# Patient Record
Sex: FEMALE | Race: WHITE | Employment: UNEMPLOYED | URBAN - METROPOLITAN AREA
[De-identification: names, ages, dates, MRNs, and addresses within clinical notes are randomized per-mention and may not be internally consistent; named-entity substitution may affect disease eponyms.]

---

## 2017-03-22 ENCOUNTER — OFFICE VISIT (OUTPATIENT)
Dept: INTERNAL MEDICINE CLINIC | Age: 42
End: 2017-03-22

## 2017-03-22 VITALS
TEMPERATURE: 98.9 F | BODY MASS INDEX: 24.66 KG/M2 | DIASTOLIC BLOOD PRESSURE: 88 MMHG | SYSTOLIC BLOOD PRESSURE: 130 MMHG | WEIGHT: 134 LBS | HEIGHT: 62 IN | HEART RATE: 104 BPM

## 2017-03-22 DIAGNOSIS — J02.9 SORE THROAT: ICD-10-CM

## 2017-03-22 DIAGNOSIS — R50.9 FEVER, UNSPECIFIED FEVER CAUSE: Primary | ICD-10-CM

## 2017-03-22 LAB
BASOPHILS ABSOLUTE: 0 K/UL (ref 0–0.2)
BASOPHILS RELATIVE PERCENT: 0.3 %
EOSINOPHILS ABSOLUTE: 0.1 K/UL (ref 0–0.6)
EOSINOPHILS RELATIVE PERCENT: 0.4 %
HCT VFR BLD CALC: 45.1 % (ref 36–48)
HEMOGLOBIN: 14.6 G/DL (ref 12–16)
INFLUENZA A ANTIBODY: NEGATIVE
INFLUENZA B ANTIBODY: NEGATIVE
LYMPHOCYTES ABSOLUTE: 1.8 K/UL (ref 1–5.1)
LYMPHOCYTES RELATIVE PERCENT: 12.1 %
MCH RBC QN AUTO: 30 PG (ref 26–34)
MCHC RBC AUTO-ENTMCNC: 32.4 G/DL (ref 31–36)
MCV RBC AUTO: 92.5 FL (ref 80–100)
MONO TEST: NEGATIVE
MONOCYTES ABSOLUTE: 0.9 K/UL (ref 0–1.3)
MONOCYTES RELATIVE PERCENT: 6 %
NEUTROPHILS ABSOLUTE: 12.3 K/UL (ref 1.7–7.7)
NEUTROPHILS RELATIVE PERCENT: 81.2 %
PDW BLD-RTO: 13.5 % (ref 12.4–15.4)
PLATELET # BLD: 331 K/UL (ref 135–450)
PMV BLD AUTO: 8.9 FL (ref 5–10.5)
RBC # BLD: 4.88 M/UL (ref 4–5.2)
S PYO AG THROAT QL: NORMAL
WBC # BLD: 15.1 K/UL (ref 4–11)

## 2017-03-22 PROCEDURE — 99213 OFFICE O/P EST LOW 20 MIN: CPT | Performed by: FAMILY MEDICINE

## 2017-03-22 PROCEDURE — 87804 INFLUENZA ASSAY W/OPTIC: CPT | Performed by: FAMILY MEDICINE

## 2017-03-22 PROCEDURE — 87880 STREP A ASSAY W/OPTIC: CPT | Performed by: FAMILY MEDICINE

## 2017-03-22 RX ORDER — AMOXICILLIN 875 MG/1
875 TABLET, COATED ORAL 2 TIMES DAILY
Qty: 20 TABLET | Refills: 0 | Status: SHIPPED | OUTPATIENT
Start: 2017-03-22 | End: 2017-04-01

## 2017-03-22 ASSESSMENT — ENCOUNTER SYMPTOMS
WHEEZING: 0
SHORTNESS OF BREATH: 0
FACIAL SWELLING: 0
CHEST TIGHTNESS: 0
TROUBLE SWALLOWING: 0

## 2017-03-23 LAB
ORGANISM: ABNORMAL
THROAT CULTURE: ABNORMAL
THROAT CULTURE: ABNORMAL

## 2017-04-04 ENCOUNTER — OFFICE VISIT (OUTPATIENT)
Dept: INTERNAL MEDICINE CLINIC | Age: 42
End: 2017-04-04

## 2017-04-04 VITALS
HEIGHT: 62 IN | DIASTOLIC BLOOD PRESSURE: 100 MMHG | WEIGHT: 135.8 LBS | HEART RATE: 80 BPM | BODY MASS INDEX: 24.99 KG/M2 | SYSTOLIC BLOOD PRESSURE: 144 MMHG

## 2017-04-04 DIAGNOSIS — R03.0 ELEVATED BP WITHOUT DIAGNOSIS OF HYPERTENSION: ICD-10-CM

## 2017-04-04 DIAGNOSIS — Z00.00 PREVENTATIVE HEALTH CARE: Primary | ICD-10-CM

## 2017-04-04 DIAGNOSIS — F43.23 ADJUSTMENT DISORDER WITH MIXED ANXIETY AND DEPRESSED MOOD: ICD-10-CM

## 2017-04-04 PROCEDURE — 99395 PREV VISIT EST AGE 18-39: CPT | Performed by: FAMILY MEDICINE

## 2017-04-04 RX ORDER — DROSPIRENONE AND ETHINYL ESTRADIOL TABLETS 0.02-3(28)
1 KIT ORAL DAILY
COMMUNITY
Start: 2017-03-15

## 2017-04-04 RX ORDER — BISOPROLOL FUMARATE AND HYDROCHLOROTHIAZIDE 2.5; 6.25 MG/1; MG/1
1 TABLET ORAL DAILY
Qty: 30 TABLET | Refills: 3 | Status: SHIPPED | OUTPATIENT
Start: 2017-04-04 | End: 2017-06-12 | Stop reason: SDUPTHER

## 2017-04-27 ENCOUNTER — OFFICE VISIT (OUTPATIENT)
Dept: PSYCHOLOGY | Age: 42
End: 2017-04-27

## 2017-04-27 DIAGNOSIS — F41.1 GAD (GENERALIZED ANXIETY DISORDER): Primary | ICD-10-CM

## 2017-04-27 LAB
A/G RATIO: 1.7 (ref 1.1–2.2)
ALBUMIN SERPL-MCNC: 4.5 G/DL (ref 3.4–5)
ALP BLD-CCNC: 42 U/L (ref 40–129)
ALT SERPL-CCNC: 8 U/L (ref 10–40)
ANION GAP SERPL CALCULATED.3IONS-SCNC: 17 MMOL/L (ref 3–16)
AST SERPL-CCNC: 12 U/L (ref 15–37)
BASOPHILS ABSOLUTE: 0.1 K/UL (ref 0–0.2)
BASOPHILS RELATIVE PERCENT: 0.8 %
BILIRUB SERPL-MCNC: 0.3 MG/DL (ref 0–1)
BUN BLDV-MCNC: 12 MG/DL (ref 7–20)
CALCIUM SERPL-MCNC: 9.4 MG/DL (ref 8.3–10.6)
CHLORIDE BLD-SCNC: 99 MMOL/L (ref 99–110)
CHOLESTEROL, TOTAL: 248 MG/DL (ref 0–199)
CO2: 23 MMOL/L (ref 21–32)
CREAT SERPL-MCNC: 0.7 MG/DL (ref 0.6–1.1)
EOSINOPHILS ABSOLUTE: 0.1 K/UL (ref 0–0.6)
EOSINOPHILS RELATIVE PERCENT: 1.2 %
GFR AFRICAN AMERICAN: >60
GFR NON-AFRICAN AMERICAN: >60
GLOBULIN: 2.6 G/DL
GLUCOSE BLD-MCNC: 81 MG/DL (ref 70–99)
HCT VFR BLD CALC: 43 % (ref 36–48)
HDLC SERPL-MCNC: 106 MG/DL (ref 40–60)
HEMOGLOBIN: 13.7 G/DL (ref 12–16)
LDL CHOLESTEROL CALCULATED: 127 MG/DL
LYMPHOCYTES ABSOLUTE: 2.6 K/UL (ref 1–5.1)
LYMPHOCYTES RELATIVE PERCENT: 35.6 %
MCH RBC QN AUTO: 29.4 PG (ref 26–34)
MCHC RBC AUTO-ENTMCNC: 31.9 G/DL (ref 31–36)
MCV RBC AUTO: 92.2 FL (ref 80–100)
MONOCYTES ABSOLUTE: 0.6 K/UL (ref 0–1.3)
MONOCYTES RELATIVE PERCENT: 8.2 %
NEUTROPHILS ABSOLUTE: 4 K/UL (ref 1.7–7.7)
NEUTROPHILS RELATIVE PERCENT: 54.2 %
PDW BLD-RTO: 13.4 % (ref 12.4–15.4)
PLATELET # BLD: 305 K/UL (ref 135–450)
PMV BLD AUTO: 8.9 FL (ref 5–10.5)
POTASSIUM SERPL-SCNC: 4.5 MMOL/L (ref 3.5–5.1)
RBC # BLD: 4.66 M/UL (ref 4–5.2)
SODIUM BLD-SCNC: 139 MMOL/L (ref 136–145)
TOTAL PROTEIN: 7.1 G/DL (ref 6.4–8.2)
TRIGL SERPL-MCNC: 75 MG/DL (ref 0–150)
TSH REFLEX: 1.77 UIU/ML (ref 0.27–4.2)
VLDLC SERPL CALC-MCNC: 15 MG/DL
WBC # BLD: 7.4 K/UL (ref 4–11)

## 2017-04-27 PROCEDURE — 90791 PSYCH DIAGNOSTIC EVALUATION: CPT | Performed by: PSYCHOLOGIST

## 2017-04-28 PROBLEM — F41.1 GAD (GENERALIZED ANXIETY DISORDER): Status: ACTIVE | Noted: 2017-04-28

## 2017-05-01 ENCOUNTER — TELEPHONE (OUTPATIENT)
Dept: INTERNAL MEDICINE CLINIC | Age: 42
End: 2017-05-01

## 2017-05-01 RX ORDER — CETIRIZINE HYDROCHLORIDE, PSEUDOEPHEDRINE HYDROCHLORIDE 5; 120 MG/1; MG/1
TABLET, FILM COATED, EXTENDED RELEASE ORAL
Qty: 30 TABLET | Refills: 5 | Status: SHIPPED | OUTPATIENT
Start: 2017-05-01

## 2017-06-12 ENCOUNTER — OFFICE VISIT (OUTPATIENT)
Dept: INTERNAL MEDICINE CLINIC | Age: 42
End: 2017-06-12

## 2017-06-12 VITALS
DIASTOLIC BLOOD PRESSURE: 74 MMHG | BODY MASS INDEX: 24.84 KG/M2 | WEIGHT: 135 LBS | SYSTOLIC BLOOD PRESSURE: 118 MMHG | HEART RATE: 68 BPM | HEIGHT: 62 IN

## 2017-06-12 DIAGNOSIS — I34.1 MITRAL VALVE PROLAPSE: ICD-10-CM

## 2017-06-12 DIAGNOSIS — R03.0 ELEVATED BP WITHOUT DIAGNOSIS OF HYPERTENSION: Primary | ICD-10-CM

## 2017-06-12 DIAGNOSIS — F43.23 ADJUSTMENT DISORDER WITH MIXED ANXIETY AND DEPRESSED MOOD: ICD-10-CM

## 2017-06-12 PROCEDURE — 99213 OFFICE O/P EST LOW 20 MIN: CPT | Performed by: FAMILY MEDICINE

## 2017-06-12 RX ORDER — SERTRALINE HYDROCHLORIDE 25 MG/1
25 TABLET, FILM COATED ORAL DAILY
Qty: 30 TABLET | Refills: 1 | Status: SHIPPED | OUTPATIENT
Start: 2017-06-12 | End: 2017-08-26 | Stop reason: SDUPTHER

## 2017-06-12 RX ORDER — BISOPROLOL FUMARATE AND HYDROCHLOROTHIAZIDE 2.5; 6.25 MG/1; MG/1
1 TABLET ORAL DAILY
Qty: 30 TABLET | Refills: 5 | Status: SHIPPED | OUTPATIENT
Start: 2017-06-12 | End: 2018-01-09 | Stop reason: SDUPTHER

## 2017-06-12 ASSESSMENT — ENCOUNTER SYMPTOMS
SHORTNESS OF BREATH: 0
CHEST TIGHTNESS: 0
WHEEZING: 0
COUGH: 0

## 2017-08-26 DIAGNOSIS — F43.23 ADJUSTMENT DISORDER WITH MIXED ANXIETY AND DEPRESSED MOOD: ICD-10-CM

## 2017-08-28 RX ORDER — SERTRALINE HYDROCHLORIDE 25 MG/1
TABLET, FILM COATED ORAL
Qty: 30 TABLET | Refills: 1 | Status: SHIPPED | OUTPATIENT
Start: 2017-08-28 | End: 2017-10-18 | Stop reason: SDUPTHER

## 2017-09-18 LAB
HPV, INTERPRETATION: NORMAL
PAP SMEAR: NORMAL

## 2017-09-27 ENCOUNTER — TELEPHONE (OUTPATIENT)
Dept: INTERNAL MEDICINE CLINIC | Age: 42
End: 2017-09-27

## 2017-09-27 RX ORDER — PREDNISONE 10 MG/1
TABLET ORAL
Qty: 30 TABLET | Refills: 0 | Status: SHIPPED | OUTPATIENT
Start: 2017-09-27 | End: 2017-10-07

## 2017-10-18 ENCOUNTER — OFFICE VISIT (OUTPATIENT)
Dept: INTERNAL MEDICINE CLINIC | Age: 42
End: 2017-10-18

## 2017-10-18 VITALS
SYSTOLIC BLOOD PRESSURE: 122 MMHG | HEIGHT: 62 IN | BODY MASS INDEX: 25.4 KG/M2 | WEIGHT: 138 LBS | HEART RATE: 68 BPM | DIASTOLIC BLOOD PRESSURE: 80 MMHG

## 2017-10-18 DIAGNOSIS — F43.23 ADJUSTMENT DISORDER WITH MIXED ANXIETY AND DEPRESSED MOOD: Primary | ICD-10-CM

## 2017-10-18 DIAGNOSIS — Z23 NEED FOR INFLUENZA VACCINATION: ICD-10-CM

## 2017-10-18 PROCEDURE — 99213 OFFICE O/P EST LOW 20 MIN: CPT | Performed by: FAMILY MEDICINE

## 2017-10-18 PROCEDURE — 90471 IMMUNIZATION ADMIN: CPT | Performed by: FAMILY MEDICINE

## 2017-10-18 PROCEDURE — 90686 IIV4 VACC NO PRSV 0.5 ML IM: CPT | Performed by: FAMILY MEDICINE

## 2017-10-18 RX ORDER — SERTRALINE HYDROCHLORIDE 100 MG/1
100 TABLET, FILM COATED ORAL DAILY
Qty: 30 TABLET | Refills: 3 | Status: SHIPPED | OUTPATIENT
Start: 2017-11-01 | End: 2018-01-17 | Stop reason: SDUPTHER

## 2017-10-18 ASSESSMENT — PATIENT HEALTH QUESTIONNAIRE - PHQ9
SUM OF ALL RESPONSES TO PHQ9 QUESTIONS 1 & 2: 0
SUM OF ALL RESPONSES TO PHQ QUESTIONS 1-9: 0
2. FEELING DOWN, DEPRESSED OR HOPELESS: 0
1. LITTLE INTEREST OR PLEASURE IN DOING THINGS: 0

## 2017-10-18 NOTE — PATIENT INSTRUCTIONS
Consider My Chart sign up. Doug for your smart phone is very helpful  Keep in touch about how you are doing with the Sertraline. Side effects that do not go away in a couple weeks if mild and tolerable or have something that is severe so I can change the medication. Then also communicate to me if no response in 6 weeks. approx 11/29/17. Let me know also if you are good at the 100 mg dose. Go up to 50 mg of Sertraline right away. In 2 weeks, bump to 100 mg = 2 tablets of the 50 mg right away. (I wrote the directions for 1.5 tablets = 45 so hopefully you don't have to get an additional 50 mg dose filled in the time you are taking it)  When you need a refill, if doing OK at 100 mg, take the blue prescription paper in for 100 mg tablet. [id number]    WEIGHT LOSS SUGGESTIONS --- pick and choose items from the following to work on.  1.  Eat breakfast --- the most important meal of the day (and try to spread your calories out over the day.)    2. Eat your biggest meal of the day at lunch or avoid having all your calories for supper. Food if to provide energy during your time of activity. We do not need food before we sleep or rest.  3.  Begin meals with a low fat salad, soup, broth or a glass of water. 4.  Eat more vegetables and whole grains at each meal.  5.  Limit sugar sweetened beverages to no more than 8 ounces of REAL fruit juice per day. Even sweet flavored diet drinks can make you crave more sweets and starches. 6.  Limit your alcohol intake. 7.  Know what a portion size looks like --- and stick to it. Measure food or learn the portion sizes using your hand, palm and fist.  Using smaller plates and glasses can help. Portion sizes:  Solid foods like a potato = the size of your fist.  Flat foods, like a piece of meat the size of your hand minus your fingers. 8.  Eat slowly so your body has time to know when you are full.   Also slowing down will help you focus on enjoying the tastes and smells of your food. Stop eating when you feel satisfied, not stuffed. 9.  Be more active in your daily life. 8. Get support from family and friends. Support is important for long-term success. 11.  Fill 1/2 your plate with vegetables and fruits;  1/4 with protein like meat or beans and 1/4 with starch --- whole grain is best.    12. AVOID ARTIFICIAL SWEETENERS and HIGH FRUCTOSE CORN SYRUP as much as possible and certainly do not use these every day. Therefore, all soda pop -- both diet and regular are out for daily use. 13.  MINIMIZE packaged foods with PRESERVATIVES. 14.  Eat food as close to nature as possible and eat organic when possible. You did not likely gain 10 pounds in 1 month, so do not expect to lose 10 pounds in 1 month. Aim for 1/2 to 1 pound per week each month consistently.

## 2017-10-18 NOTE — PROGRESS NOTES
Subjective:      Patient ID: Blake Oconnor is a 46 y.o. female. HPI   Chief Complaint   Patient presents with    3 Month Follow-Up     FU of sertraline. No side effects but no improvement either. Still feeling a lot of anxiety. She is also on Ziac for elevated BP. It seems to be fine. Not c/o  She has not had a lot of headaches so this is probably working well. Review of Systems   Constitutional: Negative for unexpected weight change. Gastrointestinal: Negative for diarrhea and nausea. Psychiatric/Behavioral: Negative for agitation, behavioral problems, confusion, self-injury, sleep disturbance and suicidal ideas. Objective:   Physical Exam   Constitutional: She appears well-developed and well-nourished. Cardiovascular: Normal rate, regular rhythm, normal heart sounds and intact distal pulses. Pulmonary/Chest: Effort normal and breath sounds normal.   Skin: Skin is warm and dry. No erythema. No pallor. Psychiatric: She has a normal mood and affect. Her behavior is normal. Cognition and memory are normal.   Vitals reviewed. Wt Readings from Last 3 Encounters:   10/18/17 138 lb (62.6 kg)   06/12/17 135 lb (61.2 kg)   04/04/17 135 lb 12.8 oz (61.6 kg)     Temp Readings from Last 3 Encounters:   03/22/17 98.9 °F (37.2 °C) (Oral)   04/19/16 98.7 °F (37.1 °C) (Oral)   10/15/13 98.6 °F (37 °C) (Oral)     BP Readings from Last 3 Encounters:   10/18/17 122/80   06/12/17 118/74   04/04/17 (!) 144/100     Pulse Readings from Last 3 Encounters:   10/18/17 68   06/12/17 68   04/04/17 80         Assessment:      1. Adjustment disorder with mixed anxiety and depressed mood  Work dose up to 50 mg daily and if needed 75 mg to 100 mg daily. Discussed. - sertraline (ZOLOFT) 50 MG tablet; TAKE 1.5 TABLETS EVERY DAY  Dispense: 45 tablet; Refill: 1    2. Need for influenza vaccination  Vaccine information statement given.   Risk and benefits of vaccine(s) given discussed with patient and

## 2017-10-19 ASSESSMENT — ENCOUNTER SYMPTOMS
NAUSEA: 0
DIARRHEA: 0

## 2017-12-17 DIAGNOSIS — F43.23 ADJUSTMENT DISORDER WITH MIXED ANXIETY AND DEPRESSED MOOD: ICD-10-CM

## 2018-01-09 DIAGNOSIS — R03.0 ELEVATED BP WITHOUT DIAGNOSIS OF HYPERTENSION: ICD-10-CM

## 2018-01-09 RX ORDER — BISOPROLOL FUMARATE AND HYDROCHLOROTHIAZIDE 2.5; 6.25 MG/1; MG/1
TABLET ORAL
Qty: 30 TABLET | Refills: 5 | Status: SHIPPED | OUTPATIENT
Start: 2018-01-09 | End: 2018-01-17 | Stop reason: SDUPTHER

## 2018-01-17 ENCOUNTER — OFFICE VISIT (OUTPATIENT)
Dept: INTERNAL MEDICINE CLINIC | Age: 43
End: 2018-01-17

## 2018-01-17 VITALS
DIASTOLIC BLOOD PRESSURE: 80 MMHG | WEIGHT: 143 LBS | BODY MASS INDEX: 26.31 KG/M2 | HEART RATE: 80 BPM | HEIGHT: 62 IN | SYSTOLIC BLOOD PRESSURE: 116 MMHG

## 2018-01-17 DIAGNOSIS — R03.0 ELEVATED BP WITHOUT DIAGNOSIS OF HYPERTENSION: ICD-10-CM

## 2018-01-17 DIAGNOSIS — F41.1 GAD (GENERALIZED ANXIETY DISORDER): Primary | ICD-10-CM

## 2018-01-17 DIAGNOSIS — R20.0 BILATERAL HAND NUMBNESS: ICD-10-CM

## 2018-01-17 PROCEDURE — 99213 OFFICE O/P EST LOW 20 MIN: CPT | Performed by: FAMILY MEDICINE

## 2018-01-17 RX ORDER — BISOPROLOL FUMARATE AND HYDROCHLOROTHIAZIDE 2.5; 6.25 MG/1; MG/1
TABLET ORAL
Qty: 90 TABLET | Refills: 1 | Status: SHIPPED | OUTPATIENT
Start: 2018-01-17 | End: 2019-01-17 | Stop reason: SDUPTHER

## 2018-01-17 RX ORDER — SERTRALINE HYDROCHLORIDE 100 MG/1
100 TABLET, FILM COATED ORAL DAILY
Qty: 90 TABLET | Refills: 3 | Status: SHIPPED | OUTPATIENT
Start: 2018-01-17 | End: 2018-04-20 | Stop reason: DRUGHIGH

## 2018-01-17 ASSESSMENT — ENCOUNTER SYMPTOMS
CHEST TIGHTNESS: 0
WHEEZING: 0
SHORTNESS OF BREATH: 0
COLOR CHANGE: 0
COUGH: 0

## 2018-01-17 NOTE — PROGRESS NOTES
difficulty, weakness and headaches. Psychiatric/Behavioral: Negative for dysphoric mood and sleep disturbance. The patient is not nervous/anxious. Objective:   Physical Exam   Constitutional: She appears well-developed and well-nourished. Cardiovascular: Normal rate, regular rhythm, normal heart sounds and intact distal pulses. No murmur heard. Pulses:       Radial pulses are 2+ on the right side, and 2+ on the left side. Pulmonary/Chest: Effort normal and breath sounds normal.   Musculoskeletal:        Right wrist: Normal.        Left wrist: Normal.   Negative Tinel. Mild tingling noted with Phalen but also with elevated arm stress test.   Skin: Skin is warm and dry. No erythema. No pallor. Hands are faintly red but they do appear dry. Not really a hyperemia and not pale nor cyanotic. Psychiatric: She has a normal mood and affect. Her behavior is normal. Cognition and memory are normal.   Vitals reviewed. Wt Readings from Last 3 Encounters:   01/17/18 143 lb (64.9 kg)   10/18/17 138 lb (62.6 kg)   06/12/17 135 lb (61.2 kg)     Temp Readings from Last 3 Encounters:   03/22/17 98.9 °F (37.2 °C) (Oral)   04/19/16 98.7 °F (37.1 °C) (Oral)   10/15/13 98.6 °F (37 °C) (Oral)     BP Readings from Last 3 Encounters:   01/17/18 116/80   10/18/17 122/80   06/12/17 118/74     Pulse Readings from Last 3 Encounters:   01/17/18 80   10/18/17 68   06/12/17 68         Assessment:      1. ARA (generalized anxiety disorder)  Doing well with current dose. - sertraline (ZOLOFT) 100 MG tablet; Take 1 tablet by mouth daily  Dispense: 90 tablet; Refill: 3    2. Bilateral hand numbness  Start with wrist splints at night. Consider Raynaud's but currently no color change in the office and she has not noted this. If wrist splints help some, may need to wear at work. If symptoms persist or develops pain, will order EMG. - Misc. Devices MISC; velcro close wrist splint with metal stay.    Bilateral hands/wrists. Dispense: 2 each; Refill: 0    3. Elevated BP without diagnosis of hypertension  BP: 116/80   At goal and meeting medical guidelines. Continue treatment. - bisoprolol-hydrochlorothiazide (ZIAC) 2.5-6.25 MG per tablet; TAKE 1 TABLET EVERY DAY  Dispense: 90 tablet; Refill: 1          Plan:      See assessment and/or orders. See after visit summary, patient instructions, and reference hand-outs. Discussed use, benefit, and side effects of prescribed medications. Barriers to medication compliance addressed. All patient questions answered.

## 2018-02-24 DIAGNOSIS — F43.23 ADJUSTMENT DISORDER WITH MIXED ANXIETY AND DEPRESSED MOOD: ICD-10-CM

## 2018-05-02 ENCOUNTER — OFFICE VISIT (OUTPATIENT)
Dept: INTERNAL MEDICINE CLINIC | Age: 43
End: 2018-05-02

## 2018-05-02 VITALS
HEART RATE: 60 BPM | SYSTOLIC BLOOD PRESSURE: 122 MMHG | HEIGHT: 62 IN | WEIGHT: 142 LBS | BODY MASS INDEX: 26.13 KG/M2 | DIASTOLIC BLOOD PRESSURE: 60 MMHG

## 2018-05-02 DIAGNOSIS — Z00.00 ROUTINE PHYSICAL EXAMINATION: Primary | ICD-10-CM

## 2018-05-02 DIAGNOSIS — R03.0 ELEVATED BP WITHOUT DIAGNOSIS OF HYPERTENSION: ICD-10-CM

## 2018-05-02 LAB
A/G RATIO: 1.7 (ref 1.1–2.2)
ALBUMIN SERPL-MCNC: 4.3 G/DL (ref 3.4–5)
ALP BLD-CCNC: 44 U/L (ref 40–129)
ALT SERPL-CCNC: 12 U/L (ref 10–40)
ANION GAP SERPL CALCULATED.3IONS-SCNC: 14 MMOL/L (ref 3–16)
AST SERPL-CCNC: 12 U/L (ref 15–37)
BASOPHILS ABSOLUTE: 0.1 K/UL (ref 0–0.2)
BASOPHILS RELATIVE PERCENT: 0.7 %
BILIRUB SERPL-MCNC: <0.2 MG/DL (ref 0–1)
BUN BLDV-MCNC: 13 MG/DL (ref 7–20)
CALCIUM SERPL-MCNC: 9.3 MG/DL (ref 8.3–10.6)
CHLORIDE BLD-SCNC: 100 MMOL/L (ref 99–110)
CHOLESTEROL, TOTAL: 234 MG/DL (ref 0–199)
CO2: 25 MMOL/L (ref 21–32)
CREAT SERPL-MCNC: 0.6 MG/DL (ref 0.6–1.1)
EOSINOPHILS ABSOLUTE: 0.1 K/UL (ref 0–0.6)
EOSINOPHILS RELATIVE PERCENT: 0.9 %
GFR AFRICAN AMERICAN: >60
GFR NON-AFRICAN AMERICAN: >60
GLOBULIN: 2.6 G/DL
GLUCOSE BLD-MCNC: 74 MG/DL (ref 70–99)
HCT VFR BLD CALC: 37.4 % (ref 36–48)
HDLC SERPL-MCNC: 98 MG/DL (ref 40–60)
HEMOGLOBIN: 12.7 G/DL (ref 12–16)
LDL CHOLESTEROL CALCULATED: 111 MG/DL
LYMPHOCYTES ABSOLUTE: 3.8 K/UL (ref 1–5.1)
LYMPHOCYTES RELATIVE PERCENT: 39.6 %
MCH RBC QN AUTO: 30.6 PG (ref 26–34)
MCHC RBC AUTO-ENTMCNC: 34.1 G/DL (ref 31–36)
MCV RBC AUTO: 89.9 FL (ref 80–100)
MONOCYTES ABSOLUTE: 0.7 K/UL (ref 0–1.3)
MONOCYTES RELATIVE PERCENT: 7 %
NEUTROPHILS ABSOLUTE: 4.9 K/UL (ref 1.7–7.7)
NEUTROPHILS RELATIVE PERCENT: 51.8 %
PDW BLD-RTO: 13.8 % (ref 12.4–15.4)
PLATELET # BLD: 360 K/UL (ref 135–450)
PMV BLD AUTO: 8.4 FL (ref 5–10.5)
POTASSIUM SERPL-SCNC: 4 MMOL/L (ref 3.5–5.1)
RBC # BLD: 4.16 M/UL (ref 4–5.2)
SODIUM BLD-SCNC: 139 MMOL/L (ref 136–145)
TOTAL PROTEIN: 6.9 G/DL (ref 6.4–8.2)
TRIGL SERPL-MCNC: 123 MG/DL (ref 0–150)
VLDLC SERPL CALC-MCNC: 25 MG/DL
WBC # BLD: 9.5 K/UL (ref 4–11)

## 2018-05-02 PROCEDURE — 99396 PREV VISIT EST AGE 40-64: CPT | Performed by: FAMILY MEDICINE

## 2018-08-23 ENCOUNTER — OFFICE VISIT (OUTPATIENT)
Dept: INTERNAL MEDICINE CLINIC | Age: 43
End: 2018-08-23

## 2018-08-23 VITALS
HEIGHT: 62 IN | HEART RATE: 68 BPM | DIASTOLIC BLOOD PRESSURE: 60 MMHG | SYSTOLIC BLOOD PRESSURE: 90 MMHG | WEIGHT: 140 LBS | BODY MASS INDEX: 25.76 KG/M2

## 2018-08-23 DIAGNOSIS — R55 VASOVAGAL ATTACK: ICD-10-CM

## 2018-08-23 DIAGNOSIS — R20.0 NUMBNESS: Primary | ICD-10-CM

## 2018-08-23 DIAGNOSIS — G43.909 MIGRAINE WITHOUT STATUS MIGRAINOSUS, NOT INTRACTABLE, UNSPECIFIED MIGRAINE TYPE: ICD-10-CM

## 2018-08-23 PROCEDURE — 99213 OFFICE O/P EST LOW 20 MIN: CPT | Performed by: FAMILY MEDICINE

## 2018-08-23 NOTE — PATIENT INSTRUCTIONS
aged cheeses, and pickled foods. · Limit caffeine by not drinking too much coffee, tea, or soda. Do not quit caffeine suddenly, because that can also give you migraines. · Do not smoke or allow others to smoke around you. If you need help quitting, talk to your doctor about stop-smoking programs and medicines. These can increase your chances of quitting for good. · If you are taking birth control pills or hormone therapy, talk to your doctor about whether they are triggering your migraines. When should you call for help? Call 911 anytime you think you may need emergency care. For example, call if:    · You have symptoms of a stroke. These may include:  ¨ Sudden numbness, tingling, weakness, or loss of movement in your face, arm, or leg, especially on only one side of your body. ¨ Sudden vision changes. ¨ Sudden trouble speaking. ¨ Sudden confusion or trouble understanding simple statements. ¨ Sudden problems with walking or balance. ¨ A sudden, severe headache that is different from past headaches.    Call your doctor now or seek immediate medical care if:    · You develop a fever and a stiff neck.     · You have new nausea and vomiting, or you cannot keep down food or liquids.    Watch closely for changes in your health, and be sure to contact your doctor if:    · You have a headache that does not get better within 1 or 2 days.     · Your headaches get worse or happen more often. Where can you learn more? Go to https://Leadformancedaniel.CoinEx.pw. org and sign in to your [employer] account. Enter  in the [facility] box to learn more about \"Recurring Migraine Headache: Care Instructions. \"     If you do not have an account, please click on the \"Sign Up Now\" link. Current as of: October 9, 2017  Content Version: 11.7  © 2507-2235 [employer], Incorporated. Care instructions adapted under license by [facility] ([facility]).  If you have questions about a medical condition or this instruction, always ask quitting, talk to your doctor about stop-smoking programs and medicines. These can increase your chances of quitting for good. · If you are taking birth control pills or hormone therapy, talk to your doctor about whether they are triggering your migraines. When should you call for help? Call 911 anytime you think you may need emergency care. For example, call if:    · You have signs of a stroke. These may include:  ¨ Sudden numbness, paralysis, or weakness in your face, arm, or leg, especially on only one side of your body. ¨ Sudden vision changes. ¨ Sudden trouble speaking. ¨ Sudden confusion or trouble understanding simple statements. ¨ Sudden problems with walking or balance. ¨ A sudden, severe headache that is different from past headaches.    Call your doctor now or seek immediate medical care if:    · You have new or worse nausea and vomiting.     · You have a new or higher fever.     · Your headache gets much worse.    Watch closely for changes in your health, and be sure to contact your doctor if:    · You are not getting better after 2 days (48 hours). Where can you learn more? Go to https://Affirm.AB Tasty. org and sign in to your E[employer] account. Enter N527 in the [employer]erks box to learn more about \"Migraine Headache: Care Instructions. \"     If you do not have an account, please click on the \"Sign Up Now\" link. Current as of: October 9, 2017  Content Version: 11.7  © 8112-9501 Healthwise, Incorporated. Care instructions adapted under license by [facility] ([facility]). If you have questions about a medical condition or this instruction, always ask your healthcare professional. [place] [address] any warranty or liability for your use of this information. Patient Education        Hypoglycemia: Care Instructions  Your Care Instructions  Hypoglycemia means that your blood sugar is low and your body is not getting enough fuel.  Some people get low blood sugar need emergency care. For example, call if:    · You passed out (lost consciousness).     · You are confused or cannot think clearly.     · Your blood sugar is very high or very low.    Watch closely for changes in your health, and be sure to contact your doctor if:    · Your blood sugar stays outside the level your doctor set for you.     · You have any problems. Where can you learn more? Go to https://Wavemaker Software.Razient. org and sign in to your Vend-[employer]-Bar account. Enter I256 in the [employer] box to learn more about \"Hypoglycemia: Care Instructions. \"     If you do not have an account, please click on the \"Sign Up Now\" link. Current as of: December 7, 2017  Content Version: 11.7  © 7036-9074 Dexterra, Incorporated. Care instructions adapted under license by [facility] ([facility]). If you have questions about a medical condition or this instruction, always ask your healthcare professional. [address] any warranty or liability for your use of this information.

## 2018-08-23 NOTE — PROGRESS NOTES
Subjective:      Patient ID: Jackie Nielsen is a 38 y.o. female. HPI   Chief Complaint   Patient presents with    Paralysis     Had some temporary paralysis on Friday last week. [place] sick all of a sudden while driving. Had to pull over, and suddenly could not move her hands or legs. Was very sweaty, and tired and weak afterward. Dizziness off and on the rest of the weekend. Worked on Friday 8/20/18 and felt really nauseous on her drive home from work. She pulled the car over off the road because she felt like she needed to vomit. Then noted that her hands felt numb and hands did draw up or spasm. Her legs felt numb also. She felt she could not move and felt parallyzed for a few minutes. Did vomit out the car window or door and was able to move then. Her legs tingled but were not limp or truly paralyzed. Her  was actually following in his car behind her and came to check on her immediately. By the time he got to her car, she was able to move arms and legs. But it took about 15 minutes before she felt she was able to use her arms and legs normally. Did not eat much that day. Raspberries and blueberries around 11 AM and drank water --- no other food or liquid. This episode happened around 5 PM.    She has been having more headaches again. Probably some of them are migrainous. Taking EXcedrine Migraine and it works pretty well. Estimates that she uses it one day per week at the most.  Had a period of time in her 25s when she had a lot of severe migraines and diagnosed as migraine. Thinks she had an MRI scan back then.           Patient Active Problem List   Diagnosis    Mitral valve prolapse    Adjustment disorder with mixed anxiety and depressed mood    Elevated BP without diagnosis of hypertension    ARA (generalized anxiety disorder)       Outpatient Prescriptions Marked as Taking for the 8/23/18 encounter (Office Visit) with Claudeen Metro, MD   Medication Sig Dispense Refill    sertraline (ZOLOFT) 50 MG tablet TAKE ONE AND ONE-HALF TABLETS BY MOUTH ONCE DAILY 45 tablet 5    bisoprolol-hydrochlorothiazide (ZIAC) 2.5-6.25 MG per tablet TAKE 1 TABLET EVERY DAY 90 tablet 1    cetirizine-psuedoephedrine ([employer] ALLERGY RELIEF-D) 5-120 MG per extended release tablet TAKE 1 TABLET BY MOUTH 2 TIMES DAILY AS NEEDED FOR ALLERGIES OR RHINITIS 30 tablet 5    LORYNA 3-0.02 MG per tablet Take 1 tablet by mouth daily           Review of Systems   Constitutional: Negative for fever. Cardiovascular: Negative for chest pain. Neurological: Negative for seizures, syncope, facial asymmetry and speech difficulty. Objective:   Physical Exam   Constitutional: She is oriented to person, place, and time. She appears well-developed and well-nourished. No distress. HENT:   Right Ear: Tympanic membrane, external ear and ear canal normal.   Left Ear: Tympanic membrane, external ear and ear canal normal.   Nose: No sinus tenderness. No epistaxis. Right sinus exhibits no maxillary sinus tenderness and no frontal sinus tenderness. Left sinus exhibits no maxillary sinus tenderness and no frontal sinus tenderness. Mouth/Throat: Uvula is midline and mucous membranes are normal. No trismus in the jaw. No uvula swelling. No oropharyngeal exudate, posterior oropharyngeal edema or posterior oropharyngeal erythema. Eyes: Pupils are equal, round, and reactive to light. Conjunctivae and EOM are normal. Right eye exhibits no discharge. Left eye exhibits no discharge. Right conjunctiva is not injected. Left conjunctiva is not injected. Fundoscopic exam:       The right eye shows no arteriolar narrowing, no AV nicking, no exudate, no hemorrhage and no papilledema. The left eye shows no arteriolar narrowing, no AV nicking, no exudate, no hemorrhage and no papilledema. Neck: Phonation normal. Neck supple. No thyromegaly present. Cardiovascular: Normal rate, regular rhythm and normal heart sounds.

## 2018-11-14 PROCEDURE — 90471 IMMUNIZATION ADMIN: CPT | Performed by: FAMILY MEDICINE

## 2018-11-14 PROCEDURE — 90682 RIV4 VACC RECOMBINANT DNA IM: CPT | Performed by: FAMILY MEDICINE

## 2018-12-20 ENCOUNTER — TELEPHONE (OUTPATIENT)
Dept: INTERNAL MEDICINE CLINIC | Age: 43
End: 2018-12-20
Payer: COMMERCIAL

## 2018-12-20 DIAGNOSIS — Z23 NEED FOR INFLUENZA VACCINATION: Primary | ICD-10-CM

## 2019-01-16 ENCOUNTER — OFFICE VISIT (OUTPATIENT)
Dept: INTERNAL MEDICINE CLINIC | Age: 44
End: 2019-01-16
Payer: COMMERCIAL

## 2019-01-16 VITALS
DIASTOLIC BLOOD PRESSURE: 70 MMHG | SYSTOLIC BLOOD PRESSURE: 110 MMHG | WEIGHT: 144 LBS | HEART RATE: 68 BPM | BODY MASS INDEX: 26.5 KG/M2 | HEIGHT: 62 IN

## 2019-01-16 DIAGNOSIS — R42 DIZZINESS: ICD-10-CM

## 2019-01-16 DIAGNOSIS — D72.820 LYMPHOCYTOSIS: Primary | ICD-10-CM

## 2019-01-16 DIAGNOSIS — R61 NIGHT SWEATS: ICD-10-CM

## 2019-01-16 LAB
A/G RATIO: 1.6 (ref 1.1–2.2)
ALBUMIN SERPL-MCNC: 4.3 G/DL (ref 3.4–5)
ALP BLD-CCNC: 43 U/L (ref 40–129)
ALT SERPL-CCNC: 8 U/L (ref 10–40)
ANION GAP SERPL CALCULATED.3IONS-SCNC: 15 MMOL/L (ref 3–16)
AST SERPL-CCNC: 11 U/L (ref 15–37)
BASOPHILS ABSOLUTE: 0.1 K/UL (ref 0–0.2)
BASOPHILS RELATIVE PERCENT: 1.1 %
BILIRUB SERPL-MCNC: 0.3 MG/DL (ref 0–1)
BUN BLDV-MCNC: 11 MG/DL (ref 7–20)
CALCIUM SERPL-MCNC: 9.1 MG/DL (ref 8.3–10.6)
CHLORIDE BLD-SCNC: 100 MMOL/L (ref 99–110)
CO2: 24 MMOL/L (ref 21–32)
CREAT SERPL-MCNC: 0.7 MG/DL (ref 0.6–1.1)
EOSINOPHILS ABSOLUTE: 0.1 K/UL (ref 0–0.6)
EOSINOPHILS RELATIVE PERCENT: 2 %
ESTIMATED AVERAGE GLUCOSE: 105.4 MG/DL
GFR AFRICAN AMERICAN: >60
GFR NON-AFRICAN AMERICAN: >60
GLOBULIN: 2.7 G/DL
GLUCOSE BLD-MCNC: 88 MG/DL (ref 70–99)
HBA1C MFR BLD: 5.3 %
HCT VFR BLD CALC: 41 % (ref 36–48)
HEMOGLOBIN: 13.5 G/DL (ref 12–16)
LYMPHOCYTES ABSOLUTE: 2.2 K/UL (ref 1–5.1)
LYMPHOCYTES RELATIVE PERCENT: 31.9 %
MCH RBC QN AUTO: 30.4 PG (ref 26–34)
MCHC RBC AUTO-ENTMCNC: 33 G/DL (ref 31–36)
MCV RBC AUTO: 92.1 FL (ref 80–100)
MONOCYTES ABSOLUTE: 0.6 K/UL (ref 0–1.3)
MONOCYTES RELATIVE PERCENT: 8.8 %
NEUTROPHILS ABSOLUTE: 3.9 K/UL (ref 1.7–7.7)
NEUTROPHILS RELATIVE PERCENT: 56.2 %
PDW BLD-RTO: 13.5 % (ref 12.4–15.4)
PLATELET # BLD: 335 K/UL (ref 135–450)
PMV BLD AUTO: 8.8 FL (ref 5–10.5)
POTASSIUM SERPL-SCNC: 4.9 MMOL/L (ref 3.5–5.1)
RBC # BLD: 4.45 M/UL (ref 4–5.2)
SODIUM BLD-SCNC: 139 MMOL/L (ref 136–145)
TOTAL PROTEIN: 7 G/DL (ref 6.4–8.2)
WBC # BLD: 6.8 K/UL (ref 4–11)

## 2019-01-16 PROCEDURE — 99213 OFFICE O/P EST LOW 20 MIN: CPT | Performed by: FAMILY MEDICINE

## 2019-01-16 ASSESSMENT — PATIENT HEALTH QUESTIONNAIRE - PHQ9
SUM OF ALL RESPONSES TO PHQ9 QUESTIONS 1 & 2: 0
SUM OF ALL RESPONSES TO PHQ QUESTIONS 1-9: 0
2. FEELING DOWN, DEPRESSED OR HOPELESS: 0
SUM OF ALL RESPONSES TO PHQ QUESTIONS 1-9: 0
1. LITTLE INTEREST OR PLEASURE IN DOING THINGS: 0

## 2019-01-16 ASSESSMENT — ENCOUNTER SYMPTOMS
CHEST TIGHTNESS: 0
WHEEZING: 0
COUGH: 0
ABDOMINAL PAIN: 0
ANAL BLEEDING: 0
SHORTNESS OF BREATH: 0
BLOOD IN STOOL: 0

## 2019-01-17 ENCOUNTER — TELEPHONE (OUTPATIENT)
Dept: INTERNAL MEDICINE CLINIC | Age: 44
End: 2019-01-17

## 2019-01-17 DIAGNOSIS — R03.0 ELEVATED BP WITHOUT DIAGNOSIS OF HYPERTENSION: ICD-10-CM

## 2019-01-17 RX ORDER — BISOPROLOL FUMARATE AND HYDROCHLOROTHIAZIDE 2.5; 6.25 MG/1; MG/1
TABLET ORAL
Qty: 90 TABLET | Refills: 1 | Status: SHIPPED | OUTPATIENT
Start: 2019-01-17 | End: 2019-05-13 | Stop reason: SDUPTHER

## 2019-05-13 ENCOUNTER — OFFICE VISIT (OUTPATIENT)
Dept: INTERNAL MEDICINE CLINIC | Age: 44
End: 2019-05-13
Payer: COMMERCIAL

## 2019-05-13 VITALS
HEIGHT: 62 IN | WEIGHT: 136 LBS | DIASTOLIC BLOOD PRESSURE: 74 MMHG | BODY MASS INDEX: 25.03 KG/M2 | HEART RATE: 64 BPM | SYSTOLIC BLOOD PRESSURE: 132 MMHG

## 2019-05-13 DIAGNOSIS — Z00.00 ANNUAL PHYSICAL EXAM: Primary | ICD-10-CM

## 2019-05-13 DIAGNOSIS — F41.1 GAD (GENERALIZED ANXIETY DISORDER): ICD-10-CM

## 2019-05-13 DIAGNOSIS — R03.0 ELEVATED BP WITHOUT DIAGNOSIS OF HYPERTENSION: ICD-10-CM

## 2019-05-13 LAB
BASOPHILS ABSOLUTE: 0.1 K/UL (ref 0–0.2)
BASOPHILS RELATIVE PERCENT: 0.9 %
CHOLESTEROL, TOTAL: 247 MG/DL (ref 0–199)
EOSINOPHILS ABSOLUTE: 0.1 K/UL (ref 0–0.6)
EOSINOPHILS RELATIVE PERCENT: 2 %
HCT VFR BLD CALC: 42.4 % (ref 36–48)
HDLC SERPL-MCNC: 108 MG/DL (ref 40–60)
HEMOGLOBIN: 13.9 G/DL (ref 12–16)
LDL CHOLESTEROL CALCULATED: 123 MG/DL
LYMPHOCYTES ABSOLUTE: 2.3 K/UL (ref 1–5.1)
LYMPHOCYTES RELATIVE PERCENT: 32.4 %
MCH RBC QN AUTO: 30.2 PG (ref 26–34)
MCHC RBC AUTO-ENTMCNC: 32.7 G/DL (ref 31–36)
MCV RBC AUTO: 92.5 FL (ref 80–100)
MONOCYTES ABSOLUTE: 0.5 K/UL (ref 0–1.3)
MONOCYTES RELATIVE PERCENT: 7.4 %
NEUTROPHILS ABSOLUTE: 4.1 K/UL (ref 1.7–7.7)
NEUTROPHILS RELATIVE PERCENT: 57.3 %
PDW BLD-RTO: 13.8 % (ref 12.4–15.4)
PLATELET # BLD: 366 K/UL (ref 135–450)
PMV BLD AUTO: 8.9 FL (ref 5–10.5)
RBC # BLD: 4.58 M/UL (ref 4–5.2)
TRIGL SERPL-MCNC: 79 MG/DL (ref 0–150)
VLDLC SERPL CALC-MCNC: 16 MG/DL
WBC # BLD: 7.1 K/UL (ref 4–11)

## 2019-05-13 PROCEDURE — 99396 PREV VISIT EST AGE 40-64: CPT | Performed by: FAMILY MEDICINE

## 2019-05-13 RX ORDER — SERTRALINE HYDROCHLORIDE 100 MG/1
100 TABLET, FILM COATED ORAL DAILY
Qty: 90 TABLET | Refills: 3 | Status: SHIPPED | OUTPATIENT
Start: 2019-05-13 | End: 2020-05-13

## 2019-05-13 RX ORDER — BISOPROLOL FUMARATE AND HYDROCHLOROTHIAZIDE 2.5; 6.25 MG/1; MG/1
TABLET ORAL
Qty: 90 TABLET | Refills: 3 | Status: SHIPPED | OUTPATIENT
Start: 2019-05-13 | End: 2020-05-13

## 2019-05-13 SDOH — HEALTH STABILITY: PHYSICAL HEALTH: ON AVERAGE, HOW MANY MINUTES DO YOU ENGAGE IN EXERCISE AT THIS LEVEL?: 40 MIN

## 2019-05-13 SDOH — HEALTH STABILITY: MENTAL HEALTH: HOW OFTEN DO YOU HAVE A DRINK CONTAINING ALCOHOL?: 4 OR MORE TIMES A WEEK

## 2019-05-13 SDOH — HEALTH STABILITY: MENTAL HEALTH: HOW MANY STANDARD DRINKS CONTAINING ALCOHOL DO YOU HAVE ON A TYPICAL DAY?: 1 OR 2

## 2019-05-13 SDOH — HEALTH STABILITY: PHYSICAL HEALTH: ON AVERAGE, HOW MANY DAYS PER WEEK DO YOU ENGAGE IN MODERATE TO STRENUOUS EXERCISE (LIKE A BRISK WALK)?: 3 DAYS

## 2019-05-13 NOTE — PATIENT INSTRUCTIONS
Take 2 of the 50 mg sertraline and refill with the 100 mg tablet. Magnesium 400 mg gelcap. [employer]Made  Makes a good one. Take 1 nightly. Patient Education        Learning About Mindfulness for Stress  What are mindfulness and stress? Stress is what you feel when you have to handle more than you are used to. A lot of things can cause stress. You may feel stress when you go on a job interview, take a test, or run a race. This kind of short-term stress is normal and even useful. It can help you if you need to work hard or react quickly. Stress also can last a long time. Long-term stress is caused by stressful situations or events. Examples of long-term stress include long-term health problems, ongoing problems at work, and conflicts in your family. Long-term stress can harm your health. Mindfulness is a focus only on things happening in the present moment. It's a process of purposefully paying attention to and being aware of your surroundings, your emotions, your thoughts, and how your body feels. You are aware of these things, but you aren't judging these experiences as \"good\" or \"bad. \" Mindfulness can help you learn to calm your mind and body to help you cope with illness, pain, and stress. How does mindfulness help to relieve stress? Mindfulness can help quiet your mind and relax your body. Studies show that it can help some people sleep better, feel less anxious, and bring their blood pressure down. And it's been shown to help some people live and cope better with certain health problems like heart disease, depression, chronic pain, and cancer. How do you practice mindfulness? To be mindful is to pay attention, to be present, and to be accepting. · When you're mindful, you do just one thing and you pay close attention to that one thing. For example, you may sit quietly and notice your emotions or how your food tastes and smells.   · When you're present, you focus on the things that are happening right now. You let go of your thoughts about the past and the future. When you dwell on the past or the future, you miss moments that can heal and strengthen you. You may miss moments like hearing a child laugh or seeing a friendly face when you think you're all alone. · When you're accepting, you don't  the present moment. Instead you accept your thoughts and feelings as they come. You can practice anytime, anywhere, and in any way you choose. You can practice in many ways. Here are a few ideas:  · While doing your chores, like washing the dishes, let your mind focus on what's in your hand. What does the dish feel like? Is the water warm or cold? · Go outside and take a few deep breaths. What is the air like? Is it warm or cold? · When you can, take some time at the start of your day to sit alone and think. · Take a slow walk by yourself. Count your steps while you breathe in and out. · Try yoga breathing exercises, stretches, and poses to strengthen and relax your muscles. · At work, if you can, try to stop for a few moments each hour. Note how your body feels. Let yourself regroup and let your mind settle before you return to what you were doing. · If you struggle with anxiety or \"worry thoughts,\" imagine your mind as a blue don and your worry thoughts as clouds. Now imagine those worry thoughts floating across your mind's don. Just let them pass by as you watch. Follow-up care is a key part of your treatment and safety. Be sure to make and go to all appointments, and call your doctor if you are having problems. It's also a good idea to know your test results and keep a list of the medicines you take. Where can you learn more? Go to https://One Hour TranslationferEducanoneb.eMotion Technologies. org and sign in to your GLAMSQUAD account. Enter E393 in the Online-OR box to learn more about \"Learning About Mindfulness for Stress. \"     If you do not have an account, please click on the \"Sign Up Now\" link.   Current as of: June 28, 2018  Content Version: 12.0  © 6693-0735 Healthwise, Incorporated. Care instructions adapted under license by [facility] ([facility]). If you have questions about a medical condition or this instruction, always ask your healthcare professional. [address] any warranty or liability for your use of this information.

## 2019-05-13 NOTE — PROGRESS NOTES
Chief Complaint   Patient presents with    Annual Exam     Fasting today       PREVENTATIVE VISIT AND EXAM      Routine exam.  Has some active issues. She is drinking her 1 etohic beverage around 10:30 PM.  dwp to move earlier in PM or avoid. Also taking her meds at bedtime. Recommend she try them in the AM.     Exercise:  Regular 3 x per week:  Cardio and weights. Patient Active Problem List   Diagnosis    Mitral valve prolapse    Adjustment disorder with mixed anxiety and depressed mood    Elevated BP without diagnosis of hypertension    ARA (generalized anxiety disorder)    Migraine without status migrainosus, not intractable    Numbness       Past Medical History:   Diagnosis Date    Labile hypertension     appears to be stress related vs white coat    Mitral valve prolapse      No past surgical history on file. Family History   Problem Relation Age of Onset    Asthma Mother     Rheum Arthritis Mother     Osteoporosis Mother     Depression Mother     High Blood Pressure Father     Other Father         IBS vs IBD    Asthma Son         outgrown    Anxiety Disorder Son         or other disorder;  behavorial issues.  Other Brother         IBS    Thyroid Disease Sister     Heart Disease Paternal Grandfather      Social History     Tobacco Use    Smoking status: Never Smoker    Smokeless tobacco: Never Used   Substance Use Topics    Alcohol use:  Yes     Alcohol/week: 2.4 - 3.0 oz     Types: 4 - 5 Standard drinks or equivalent per week     Comment: max of 2 in a sitting    Drug use: No       Outpatient Medications Marked as Taking for the 5/13/19 encounter (Office Visit) with Gricelda Mckeon MD   Medication Sig Dispense Refill    sertraline (ZOLOFT) 50 MG tablet Take 1.5 tablets by mouth daily 135 tablet 1    bisoprolol-hydrochlorothiazide (ZIAC) 2.5-6.25 MG per tablet TAKE 1 TABLET BY MOUTH EVERY DAY 90 tablet 1    LORYNA 3-0.02 MG per tablet Take 1 tablet by mouth daily ROS:  Full 12 system review done and all negative except for the following:   Fatigue, hot flashes, night sweats, muscle and back pain, worrisome moles, numbness and tingling hands, anxiety, excessive worry, depression. Physical Exam   Vitals:    05/13/19 0923   BP: 132/74   Pulse: 64      Body mass index is 24.87 kg/m². Wt Readings from Last 3 Encounters:   05/13/19 136 lb (61.7 kg)   01/16/19 144 lb (65.3 kg)   08/23/18 140 lb (63.5 kg)         Constitutional: Oriented to person, place, and time. Appears well-developed and well-nourished. No distress. HENT:   Head: Normocephalic and atraumatic. Ears: Hearing, tympanic membrane, external ear and ear canal normal.   Nose: Nose normal.   Mouth/Throat: Uvula is midline, oropharynx is clear and moist and mucous membranes are normal.   Eyes: Conjunctivae and EOM are normal. Pupils are equal, round, and reactive to light. No scleral icterus. Neck: Normal range of motion. Neck supple. Normal carotid pulses and no JVD present. Carotid bruit is not present. No tracheal deviation present. No mass and no thyromegaly present. Cardiovascular: Normal rate, regular rhythm, S1 normal, S2 normal, normal heart sounds and intact distal pulses. No murmur heard. Pulmonary/Chest: Effort normal and breath sounds normal. No stridor. No respiratory distress. No decreased breath sounds. No wheezes. No rhonchi. No rales. Abdominal: Soft. Normal appearance and bowel sounds are normal. No distension, no abdominal bruit and no mass. There is no hepatomegaly. No tenderness. Musculoskeletal: Normal range of motion. No edema. Lymphadenopathy:     No cervical adenopathy. No axillary adenopathy. No supraclavicular adenopathy. No Inguinal adenopathy. Neurological: Alert and oriented to person, place, and time. Normal reflexes. No tremor. No cranial nerve deficit. She exhibits normal muscle tone. Coordination and gait normal.   Skin: Skin is warm and dry. No rash noted. Not diaphoretic. No cyanosis. No pallor. Nails show no clubbing. Psychiatric:  Normal mood and affect. Speech is normal and behavior is normal. Cognition and memory are normal.       Assessment:      1. Annual physical exam  HM issues are up to day. Needs lipids for insurance. - CBC Auto Differential  - Lipid Panel    2. ARA (generalized anxiety disorder)  Increase dose of Sertraline. Mindfulness, relaxation. - sertraline (ZOLOFT) 100 MG tablet; Take 1 tablet by mouth daily  Dispense: 90 tablet; Refill: 3    3. Elevated BP without diagnosis of hypertension  Continue with Ziac daily. - bisoprolol-hydrochlorothiazide (ZIAC) 2.5-6.25 MG per tablet; TAKE 1 TABLET BY MOUTH EVERY DAY  Dispense: 90 tablet; Refill: 3    4. Myalgia, fatigue, poor sleep. Stop etoh too close to bedtime. Adjust medication as above. Trial of Magnesium 400 mg nightly if not better. Plan:    See orders and patient instructions. Age-appropriate preventative issues discussed and hand out given. An electronic signature was used to authenticate this note. --Kody Kuhn MD on 9:26 AM , See orders. See after visit summary, patient instructions, and reference hand-outs. A PRINTED SUMMARY = AVS GIVEN TO THE PATIENT. Discussed use, benefit, and side effects of prescribed medications. Barriers to medication compliance addressed. All patient questions answered.

## 2019-06-07 RX ORDER — LORATADINE 10 MG/1
10 TABLET ORAL DAILY
Qty: 90 TABLET | Refills: 1 | Status: SHIPPED | OUTPATIENT
Start: 2019-06-07

## 2019-12-28 ENCOUNTER — NURSE TRIAGE (OUTPATIENT)
Dept: OTHER | Facility: CLINIC | Age: 44
End: 2019-12-28

## 2020-02-19 ENCOUNTER — OFFICE VISIT (OUTPATIENT)
Dept: INTERNAL MEDICINE CLINIC | Age: 45
End: 2020-02-19
Payer: COMMERCIAL

## 2020-02-19 ENCOUNTER — TELEPHONE (OUTPATIENT)
Dept: INTERNAL MEDICINE CLINIC | Age: 45
End: 2020-02-19

## 2020-02-19 VITALS
WEIGHT: 129 LBS | BODY MASS INDEX: 23.74 KG/M2 | SYSTOLIC BLOOD PRESSURE: 122 MMHG | HEIGHT: 62 IN | TEMPERATURE: 99.1 F | DIASTOLIC BLOOD PRESSURE: 80 MMHG | HEART RATE: 88 BPM

## 2020-02-19 LAB — S PYO AG THROAT QL: POSITIVE

## 2020-02-19 PROCEDURE — 87880 STREP A ASSAY W/OPTIC: CPT | Performed by: FAMILY MEDICINE

## 2020-02-19 PROCEDURE — 99213 OFFICE O/P EST LOW 20 MIN: CPT | Performed by: FAMILY MEDICINE

## 2020-02-19 RX ORDER — AMOXICILLIN 875 MG/1
875 TABLET, COATED ORAL 2 TIMES DAILY
Qty: 20 TABLET | Refills: 0 | Status: SHIPPED | OUTPATIENT
Start: 2020-02-19 | End: 2020-02-29

## 2020-02-19 ASSESSMENT — ENCOUNTER SYMPTOMS
COUGH: 0
RHINORRHEA: 0
SHORTNESS OF BREATH: 0

## 2020-02-19 NOTE — PROGRESS NOTES
PATIENT. Discussed use, benefit, and side effects of prescribed medications. Barriers to medication compliance addressed. All patient questions answered.           Jimbo Aguilar MD

## 2020-02-19 NOTE — PATIENT INSTRUCTIONS
Patient Education        Strep Throat: Care Instructions  Your Care Instructions    Strep throat is a bacterial infection that causes sudden, severe sore throat and fever. Strep throat, which is caused by bacteria called streptococcus, is treated with antibiotics. Sometimes a strep test is necessary to tell if the sore throat is caused by strep bacteria. Treatment can help ease symptoms and may prevent future problems. Follow-up care is a key part of your treatment and safety. Be sure to make and go to all appointments, and call your doctor if you are having problems. It's also a good idea to know your test results and keep a list of the medicines you take. How can you care for yourself at home? · Take your antibiotics as directed. Do not stop taking them just because you feel better. You need to take the full course of antibiotics. · Strep throat can spread to others until 24 hours after you begin taking antibiotics. During this time, you should avoid contact with other people at work or home, especially infants and children. Do not sneeze or cough on others, and wash your hands often. Keep your drinking glass and eating utensils separate from those of others, and wash these items well in hot, soapy water. · Gargle with warm salt water at least once each hour to help reduce swelling and make your throat feel better. Use 1 teaspoon of salt mixed in 8 fluid ounces of warm water. · Take an over-the-counter pain medication, such as acetaminophen (Tylenol), ibuprofen (Advil, Motrin), or naproxen (Aleve). Read and follow all instructions on the label. · Try an over-the-counter anesthetic throat spray or throat lozenges, which may help relieve throat pain. · Drink plenty of fluids. Fluids may help soothe an irritated throat. Hot fluids, such as tea or soup, may help your throat feel better. · Eat soft solids and drink plenty of clear liquids.  Flavored ice pops, ice cream, scrambled eggs, sherbet, and gelatin dessert (such as Jell-O) may also soothe the throat. · Get lots of rest.  · Do not smoke, and avoid secondhand smoke. If you need help quitting, talk to your doctor about stop-smoking programs and medicines. These can increase your chances of quitting for good. · Use a vaporizer or humidifier to add moisture to the air in your bedroom. Follow the directions for cleaning the machine. When should you call for help? Call your doctor now or seek immediate medical care if:    · You have a new or higher fever.     · You have a fever with a stiff neck or severe headache.     · You have new or worse trouble swallowing.     · Your sore throat gets much worse on one side.     · Your pain becomes much worse on one side of your throat.    Watch closely for changes in your health, and be sure to contact your doctor if:    · You are not getting better after 2 days (48 hours).     · You do not get better as expected. Where can you learn more? Go to https://[employer]. org and sign in to your [employer] account. Enter K625 in the [employer] box to learn more about \"Strep Throat: Care Instructions. \"     If you do not have an account, please click on the \"Sign Up Now\" link. Current as of: July 28, 2019  Content Version: 12.3  © 4030-1874 [employer]. Care instructions adapted under license by [facility] ([facility]). If you have questions about a medical condition or this instruction, always ask your healthcare professional. [place] [address] any warranty or liability for your use of this information.

## 2020-06-03 ENCOUNTER — TELEPHONE (OUTPATIENT)
Dept: FAMILY MEDICINE CLINIC | Age: 45
End: 2020-06-03

## 2020-06-03 RX ORDER — MONTELUKAST SODIUM 10 MG/1
10 TABLET ORAL DAILY
Qty: 30 TABLET | Refills: 3 | Status: SHIPPED | OUTPATIENT
Start: 2020-06-03 | End: 2020-11-09

## 2020-06-03 NOTE — TELEPHONE ENCOUNTER
Patient called to advised her allergy medication isnt strong enough and would like to have something a little stronger called in for her .

## 2020-10-29 LAB
HIGH RISK HPV CERVIX: NORMAL
PAP THIN PREP RESULT, CERVIX: NORMAL

## 2020-12-07 ENCOUNTER — OFFICE VISIT (OUTPATIENT)
Dept: INTERNAL MEDICINE CLINIC | Age: 45
End: 2020-12-07
Payer: COMMERCIAL

## 2020-12-07 VITALS
DIASTOLIC BLOOD PRESSURE: 64 MMHG | WEIGHT: 143 LBS | TEMPERATURE: 97 F | HEIGHT: 62 IN | SYSTOLIC BLOOD PRESSURE: 110 MMHG | BODY MASS INDEX: 26.31 KG/M2 | HEART RATE: 68 BPM

## 2020-12-07 PROBLEM — J30.9 ALLERGIC RHINITIS: Status: ACTIVE | Noted: 2020-12-07

## 2020-12-07 PROBLEM — R40.0 DAYTIME SLEEPINESS: Status: ACTIVE | Noted: 2020-12-07

## 2020-12-07 LAB
A/G RATIO: 1.7 (ref 1.1–2.2)
ALBUMIN SERPL-MCNC: 4.5 G/DL (ref 3.4–5)
ALP BLD-CCNC: 50 U/L (ref 40–129)
ALT SERPL-CCNC: 8 U/L (ref 10–40)
ANION GAP SERPL CALCULATED.3IONS-SCNC: 12 MMOL/L (ref 3–16)
AST SERPL-CCNC: 13 U/L (ref 15–37)
BASOPHILS ABSOLUTE: 0.1 K/UL (ref 0–0.2)
BASOPHILS RELATIVE PERCENT: 0.8 %
BILIRUB SERPL-MCNC: 0.3 MG/DL (ref 0–1)
BUN BLDV-MCNC: 14 MG/DL (ref 7–20)
CALCIUM SERPL-MCNC: 9.4 MG/DL (ref 8.3–10.6)
CHLORIDE BLD-SCNC: 101 MMOL/L (ref 99–110)
CHOLESTEROL, TOTAL: 275 MG/DL (ref 0–199)
CO2: 25 MMOL/L (ref 21–32)
CREAT SERPL-MCNC: 0.6 MG/DL (ref 0.6–1.1)
EOSINOPHILS ABSOLUTE: 0.1 K/UL (ref 0–0.6)
EOSINOPHILS RELATIVE PERCENT: 0.9 %
GFR AFRICAN AMERICAN: >60
GFR NON-AFRICAN AMERICAN: >60
GLOBULIN: 2.7 G/DL
GLUCOSE BLD-MCNC: 84 MG/DL (ref 70–99)
HCT VFR BLD CALC: 42.1 % (ref 36–48)
HDLC SERPL-MCNC: 125 MG/DL (ref 40–60)
HEMOGLOBIN: 13.8 G/DL (ref 12–16)
LDL CHOLESTEROL CALCULATED: 133 MG/DL
LYMPHOCYTES ABSOLUTE: 3 K/UL (ref 1–5.1)
LYMPHOCYTES RELATIVE PERCENT: 31.6 %
MCH RBC QN AUTO: 30 PG (ref 26–34)
MCHC RBC AUTO-ENTMCNC: 32.9 G/DL (ref 31–36)
MCV RBC AUTO: 91.2 FL (ref 80–100)
MONOCYTES ABSOLUTE: 0.7 K/UL (ref 0–1.3)
MONOCYTES RELATIVE PERCENT: 7.4 %
NEUTROPHILS ABSOLUTE: 5.6 K/UL (ref 1.7–7.7)
NEUTROPHILS RELATIVE PERCENT: 59.3 %
PDW BLD-RTO: 13.5 % (ref 12.4–15.4)
PLATELET # BLD: 369 K/UL (ref 135–450)
PMV BLD AUTO: 8.3 FL (ref 5–10.5)
POTASSIUM SERPL-SCNC: 4.9 MMOL/L (ref 3.5–5.1)
RBC # BLD: 4.61 M/UL (ref 4–5.2)
SODIUM BLD-SCNC: 138 MMOL/L (ref 136–145)
TOTAL PROTEIN: 7.2 G/DL (ref 6.4–8.2)
TRIGL SERPL-MCNC: 86 MG/DL (ref 0–150)
TSH REFLEX: 2.11 UIU/ML (ref 0.27–4.2)
VLDLC SERPL CALC-MCNC: 17 MG/DL
WBC # BLD: 9.4 K/UL (ref 4–11)

## 2020-12-07 PROCEDURE — 90686 IIV4 VACC NO PRSV 0.5 ML IM: CPT | Performed by: FAMILY MEDICINE

## 2020-12-07 PROCEDURE — 90471 IMMUNIZATION ADMIN: CPT | Performed by: FAMILY MEDICINE

## 2020-12-07 PROCEDURE — 99396 PREV VISIT EST AGE 40-64: CPT | Performed by: FAMILY MEDICINE

## 2020-12-07 RX ORDER — BISOPROLOL FUMARATE AND HYDROCHLOROTHIAZIDE 2.5; 6.25 MG/1; MG/1
TABLET ORAL
Qty: 90 TABLET | Refills: 3 | Status: SHIPPED | OUTPATIENT
Start: 2020-12-07 | End: 2021-12-08 | Stop reason: SDUPTHER

## 2020-12-07 RX ORDER — MONTELUKAST SODIUM 10 MG/1
10 TABLET ORAL DAILY
Qty: 90 TABLET | Refills: 0 | Status: SHIPPED | OUTPATIENT
Start: 2020-12-07 | End: 2021-04-28

## 2020-12-07 RX ORDER — SERTRALINE HYDROCHLORIDE 100 MG/1
TABLET, FILM COATED ORAL
Qty: 90 TABLET | Refills: 3 | Status: SHIPPED | OUTPATIENT
Start: 2020-12-07 | End: 2021-12-08 | Stop reason: SDUPTHER

## 2020-12-07 SDOH — ECONOMIC STABILITY: FOOD INSECURITY: WITHIN THE PAST 12 MONTHS, YOU WORRIED THAT YOUR FOOD WOULD RUN OUT BEFORE YOU GOT MONEY TO BUY MORE.: NEVER TRUE

## 2020-12-07 SDOH — ECONOMIC STABILITY: FOOD INSECURITY: WITHIN THE PAST 12 MONTHS, THE FOOD YOU BOUGHT JUST DIDN'T LAST AND YOU DIDN'T HAVE MONEY TO GET MORE.: NEVER TRUE

## 2020-12-07 SDOH — ECONOMIC STABILITY: TRANSPORTATION INSECURITY
IN THE PAST 12 MONTHS, HAS LACK OF TRANSPORTATION KEPT YOU FROM MEETINGS, WORK, OR FROM GETTING THINGS NEEDED FOR DAILY LIVING?: NO

## 2020-12-07 SDOH — HEALTH STABILITY: PHYSICAL HEALTH: ON AVERAGE, HOW MANY DAYS PER WEEK DO YOU ENGAGE IN MODERATE TO STRENUOUS EXERCISE (LIKE A BRISK WALK)?: 1 DAY

## 2020-12-07 SDOH — ECONOMIC STABILITY: INCOME INSECURITY: HOW HARD IS IT FOR YOU TO PAY FOR THE VERY BASICS LIKE FOOD, HOUSING, MEDICAL CARE, AND HEATING?: NOT HARD AT ALL

## 2020-12-07 SDOH — HEALTH STABILITY: PHYSICAL HEALTH: ON AVERAGE, HOW MANY MINUTES DO YOU ENGAGE IN EXERCISE AT THIS LEVEL?: 50 MIN

## 2020-12-07 SDOH — ECONOMIC STABILITY: TRANSPORTATION INSECURITY
IN THE PAST 12 MONTHS, HAS THE LACK OF TRANSPORTATION KEPT YOU FROM MEDICAL APPOINTMENTS OR FROM GETTING MEDICATIONS?: NO

## 2020-12-07 ASSESSMENT — PATIENT HEALTH QUESTIONNAIRE - PHQ9
SUM OF ALL RESPONSES TO PHQ QUESTIONS 1-9: 2
2. FEELING DOWN, DEPRESSED OR HOPELESS: 1
SUM OF ALL RESPONSES TO PHQ9 QUESTIONS 1 & 2: 2
1. LITTLE INTEREST OR PLEASURE IN DOING THINGS: 1

## 2020-12-07 NOTE — PROGRESS NOTES
Chief Complaint   Patient presents with    Annual Exam       PREVENTATIVE VISIT AND EXAM      See CAMACHO. She is not sleeping great at night. Does not drink a lot of wine but it is normally later at night --- close to bedtime. Exercise: Once a week for 45 min. Patient Active Problem List   Diagnosis    Mitral valve prolapse    Adjustment disorder with mixed anxiety and depressed mood    Elevated BP without diagnosis of hypertension    ARA (generalized anxiety disorder)    Migraine without status migrainosus, not intractable    Numbness       Past Medical History:   Diagnosis Date    Labile hypertension     appears to be stress related vs white coat    Mitral valve prolapse      No past surgical history on file. Family History   Problem Relation Age of Onset    Asthma Mother     Rheum Arthritis Mother     Osteoporosis Mother     Depression Mother     High Blood Pressure Father     Other Father         IBS vs IBD    Asthma Son         outgrown    Anxiety Disorder Son         or other disorder;  behavorial issues.  Other Brother         IBS    Thyroid Disease Sister     Heart Disease Paternal Grandfather      Social History     Tobacco Use    Smoking status: Never Smoker    Smokeless tobacco: Never Used   Substance Use Topics    Alcohol use:  Yes     Alcohol/week: 4.0 - 5.0 standard drinks     Types: 4 - 5 Standard drinks or equivalent per week     Frequency: 4 or more times a week     Drinks per session: 1 or 2     Binge frequency: Never     Comment: max of 2 in a sitting    Drug use: No       Outpatient Medications Marked as Taking for the 12/7/20 encounter (Office Visit) with Yaw Rivera MD   Medication Sig Dispense Refill    sertraline (ZOLOFT) 100 MG tablet TAKE 1 TABLET BY MOUTH EVERY DAY 90 tablet 0    bisoprolol-hydroCHLOROthiazide (ZIAC) 2.5-6.25 MG per tablet TAKE 1 TABLET BY MOUTH EVERY DAY 90 tablet 0    montelukast (SINGULAIR) 10 MG tablet TAKE 1 TABLET BY MOUTH DAILY DURING ALLERGY SEASON 90 tablet 0    loratadine (CLARITIN) 10 MG tablet Take 1 tablet by mouth daily During allergy season 90 tablet 1    LORYNA 3-0.02 MG per tablet Take 1 tablet by mouth daily           ROS:  Full 12 system review done and all negative except for the following:   Excess daytime sleepiness. Hot flashes, night sweats. Causes her to wake up 2-3 times per week. Not sleeping well. Not a sound sleeper. She does snore. Memory problems worse than others her age. Simple things that her  will tell her and that she does not remember. Physical Exam   Vitals:    12/07/20 0916   BP: 110/64   Pulse: 68   Temp: 97 °F (36.1 °C)      Body mass index is 26.16 kg/m². Wt Readings from Last 3 Encounters:   12/07/20 143 lb (64.9 kg)   02/19/20 129 lb (58.5 kg)   05/13/19 136 lb (61.7 kg)         Constitutional: Oriented to person, place, and time. Appears well-developed and well-nourished. No distress. HENT:   Head: Normocephalic and atraumatic. Ears: Hearing, tympanic membrane, external ear and ear canal normal.   Nose: Nose normal.   Mouth/Throat: Uvula is midline, oropharynx is clear and moist and mucous membranes are normal.   Eyes: Conjunctivae and EOM are normal. Pupils are equal, round, and reactive to light. No scleral icterus. Neck: Normal range of motion. Neck supple. Normal carotid pulses and no JVD present. Carotid bruit is not present. No tracheal deviation present. No mass and no thyromegaly present. Cardiovascular: Normal rate, regular rhythm, S1 normal, S2 normal, normal heart sounds and intact distal pulses. No murmur heard. Pulmonary/Chest: Effort normal and breath sounds normal. No stridor. No respiratory distress. No decreased breath sounds. No wheezes. No rhonchi. No rales. Abdominal: Soft. Normal appearance and bowel sounds are normal. No distension, no abdominal bruit and no mass. There is no hepatomegaly. No tenderness.    Musculoskeletal: Normal

## 2021-01-08 ENCOUNTER — OFFICE VISIT (OUTPATIENT)
Dept: SLEEP MEDICINE | Age: 46
End: 2021-01-08
Payer: COMMERCIAL

## 2021-01-08 VITALS
RESPIRATION RATE: 12 BRPM | BODY MASS INDEX: 26.35 KG/M2 | HEIGHT: 62 IN | OXYGEN SATURATION: 100 % | TEMPERATURE: 97.1 F | WEIGHT: 143.2 LBS | DIASTOLIC BLOOD PRESSURE: 73 MMHG | HEART RATE: 69 BPM | SYSTOLIC BLOOD PRESSURE: 125 MMHG

## 2021-01-08 DIAGNOSIS — G47.33 OBSTRUCTIVE SLEEP APNEA: Primary | ICD-10-CM

## 2021-01-08 DIAGNOSIS — F45.8 BRUXISM: ICD-10-CM

## 2021-01-08 DIAGNOSIS — I10 HYPERTENSION, UNSPECIFIED TYPE: ICD-10-CM

## 2021-01-08 DIAGNOSIS — R61 NIGHT SWEATS: ICD-10-CM

## 2021-01-08 PROCEDURE — 99204 OFFICE O/P NEW MOD 45 MIN: CPT | Performed by: PSYCHIATRY & NEUROLOGY

## 2021-01-08 ASSESSMENT — SLEEP AND FATIGUE QUESTIONNAIRES
HOW LIKELY ARE YOU TO NOD OFF OR FALL ASLEEP WHEN YOU ARE A PASSENGER IN A CAR FOR AN HOUR WITHOUT A BREAK: 2
HOW LIKELY ARE YOU TO NOD OFF OR FALL ASLEEP WHILE SITTING INACTIVE IN A PUBLIC PLACE: 1
HOW LIKELY ARE YOU TO NOD OFF OR FALL ASLEEP WHILE SITTING AND READING: 0
HOW LIKELY ARE YOU TO NOD OFF OR FALL ASLEEP WHILE WATCHING TV: 2
HOW LIKELY ARE YOU TO NOD OFF OR FALL ASLEEP IN A CAR, WHILE STOPPED FOR A FEW MINUTES IN TRAFFIC: 0
ESS TOTAL SCORE: 8

## 2021-01-08 ASSESSMENT — ENCOUNTER SYMPTOMS
EYES NEGATIVE: 1
APNEA: 0
GASTROINTESTINAL NEGATIVE: 1
CHOKING: 0
RESPIRATORY NEGATIVE: 1
ALLERGIC/IMMUNOLOGIC NEGATIVE: 1

## 2021-01-08 NOTE — PROGRESS NOTES
MD PRESTON Robles Board Certified in Sleep Medicine  Certified [facility] Sleep Medicine  Board Certified in Neurology [address]  [address] [facility] [address] [facility], [address]  [address], [address] [facility]           [address] [address] [phone]    Subjective:     Patient ID: Asya Duran is a 39 y.o. female. Chief Complaint   Patient presents with    Follow-up     fatigue       HPI:        Asya Duran is a 39 y.o. female referred by Dr. Demarco Whaley for a sleep evaluation. She complains of snoring, tossing and turning, excessive daytime sleepiness, feels sleepy during the day, take naps during the day, night sweats  but she denies snorting, choking, periods of not breathing, knees buckling with laughing, completely or partially paralyzed while falling asleep or waking up, noisy environment, uncomfortable room temperature, uncomfortable bedding. Symptoms began 2 years ago, gradually worsening since that time. The patient's bed-partner confirmed the snoring without the stopped breathing at night  SLEEP SCHEDULE: Goes to bed around 11 PM in the weekdays and 12 AM in the weekends. It usually takes the patient 5-10 minutes to fall asleep. The patient gets up 2-3 per night to go to the bathroom. The Patient finally gets up at 6:30 AM during the weekdays and 8-9 AM in the weekends. patient wakes up with dry mouth. The patient has restless sleep with frequent arousals in addition to the Patient has significant daytime sleepiness. The Patient scored Total score: 8 on [place] Sleepiness Scale ( more than 10 is indicative of daytime sleepiness)and 40 in fatigue scale ( more than 36 is indicative of daytime fatigue). The patient takes daily nap for  minutes and usually is not refreshing nap. Previous evaluation and treatment has included- none. She grinds teeth and wakes up with sore jaw, has not used mouth guard. DOT/CDL - N/A  VEENA/'kusum - N/A  The patient HTN is stable. Previous Report(s) Reviewed: historical medical records       Social History     Socioeconomic History    Marital status:      Spouse name: Tj Peguero Number of children: 3    Years of education: 15    Highest education level: Not on file   Occupational History    Occupation: homemaker    Occupation: part time:  packs boxes     Comment: [address] Financial resource strain: Not hard at all   [employer] insecurity     Worry: Never true     Inability: Never true   [employer] needs     Medical: No     Non-medical: No   Tobacco Use    Smoking status: Never Smoker    Smokeless tobacco: Never Used   Substance and Sexual Activity    Alcohol use:  Yes     Alcohol/week: 8.0 standard drinks     Types: 8 Standard drinks or equivalent per week     Frequency: 4 or more times a week     Drinks per session: 1 or 2     Binge frequency: Never     Comment: max of 2 in a sitting    Drug use: No    Sexual activity: Yes     Partners: Male     Birth control/protection: Pill   Lifestyle    Physical activity     Days per week: 1 day     Minutes per session: 50 min    Stress: Not on file   Relationships    Social connections     Talks on phone: Not on file     Gets together: Not on file     Attends Restorationist service: Not on file     Active member of club or organization: Not on file     Attends meetings of clubs or organizations: Not on file     Relationship status: Not on file    Intimate partner violence     Fear of current or ex partner: Not on file     Emotionally abused: Not on file     Physically abused: Not on file     Forced sexual activity: Not on file   Other Topics Concern    Not on file   Social History Narrative    Not on file       Prior to Admission medications Medication Sig Start Date End Date Taking? Authorizing Provider   bisoprolol-hydroCHLOROthiazide ([place]) 2.5-6.25 MG per tablet TAKE 1 TABLET BY MOUTH EVERY DAY 12/7/20  Yes Tara Wong MD   sertraline (ZOLOFT) 100 MG tablet TAKE 1 TABLET BY MOUTH EVERY DAY 12/7/20  Yes Tara Wong MD   montelukast (SINGULAIR) 10 MG tablet Take 1 tablet by mouth daily During allergy season 12/7/20  Yes Tara Wong MD   loratadine (CLARITIN) 10 MG tablet Take 1 tablet by mouth daily During allergy season 6/7/19  Yes Tara Wong MD   cetirizine-psuedoephedrine ([facility] ALLERGY RELIEF-D) 5-120 MG per extended release tablet TAKE 1 TABLET BY MOUTH 2 TIMES DAILY AS NEEDED FOR ALLERGIES OR RHINITIS 5/1/17  Yes Tara Wong MD   LORYNA 3-0.02 MG per tablet Take 1 tablet by mouth daily 3/15/17  Yes Historical Provider, MD       Allergies as of 01/08/2021 - Review Complete 01/08/2021   Allergen Reaction Noted    Levofloxacin Nausea Only 04/19/2016       Patient Active Problem List   Diagnosis    Mitral valve prolapse    Adjustment disorder with mixed anxiety and depressed mood    Elevated BP without diagnosis of hypertension    ARA (generalized anxiety disorder)    Migraine without status migrainosus, not intractable    Numbness    Daytime sleepiness    Allergic rhinitis       Past Medical History:   Diagnosis Date    Labile hypertension     appears to be stress related vs white coat    Mitral valve prolapse        History reviewed. No pertinent surgical history. Family History   Problem Relation Age of Onset    Asthma Mother     Rheum Arthritis Mother     Osteoporosis Mother     Depression Mother     High Blood Pressure Father     Other Father         IBS vs IBD    Asthma Son         outgrown    Anxiety Disorder Son         or other disorder;  behavorial issues.     Other Brother         IBS    Thyroid Disease Sister     Heart Disease Paternal Grandfather        Review of Systems Constitutional: Positive for diaphoresis and fatigue. HENT: Positive for congestion. Eyes: Negative. Respiratory: Negative. Negative for apnea and choking. Cardiovascular: Negative. Gastrointestinal: Negative. Endocrine: Negative. Genitourinary: Positive for frequency. Musculoskeletal: Positive for arthralgias and myalgias. Skin: Negative. Allergic/Immunologic: Negative. Neurological: Negative for headaches. Hematological: Negative. Psychiatric/Behavioral: Positive for dysphoric mood. Objective:     Vitals:  Weight BMI Neck circumference    Wt Readings from Last 3 Encounters:   01/08/21 143 lb 3.2 oz (65 kg)   12/07/20 143 lb (64.9 kg)   02/19/20 129 lb (58.5 kg)    Body mass index is 26.19 kg/m². Neck circumference: 14     BP HR SaO2   BP Readings from Last 3 Encounters:   01/08/21 125/73   12/07/20 110/64   02/19/20 122/80    Pulse Readings from Last 3 Encounters:   01/08/21 69   12/07/20 68   02/19/20 88    SpO2 Readings from Last 3 Encounters:   01/08/21 100%        The mandibular molar Class :   [x]1 []2 []3      Mallampati I Airway Classification:   [x]1 []2 []3 []4        Physical Exam  Vitals signs and nursing note reviewed. Constitutional:       Appearance: Normal appearance. HENT:      Head: Atraumatic. Nose: Nose normal.      Mouth/Throat:      Comments: Mallampati class 1, no retrognathia or hypognathia , normal airflow in bilateral nostrils, no septum deviation , crowded oropharynx with low soft palate, high arched hard palate,no tonsils enlargement. Eyes:      Extraocular Movements: Extraocular movements intact. Neck:      Musculoskeletal: Normal range of motion and neck supple. Cardiovascular:      Rate and Rhythm: Normal rate and regular rhythm. Heart sounds: Normal heart sounds. Pulmonary:      Effort: Pulmonary effort is normal.      Breath sounds: Normal breath sounds. Musculoskeletal: Normal range of motion.    Skin: General: Skin is warm. Neurological:      General: No focal deficit present. Psychiatric:         Mood and Affect: Mood normal.         Assessment:    Obstructive sleep apnea especially with snoring, snorting, daytime sleepiness,. Diagnosis Orders   1. Obstructive sleep apnea  Home Sleep Study   2. Hypertension, unspecified type  Home Sleep Study   3. Night sweats  Home Sleep Study   4. Bruxism  Home Sleep Study     Plan:   interested in Mandibular advancement device. Patient was counseled about the pathophysiology of obstructive sleep apnea syndrome and the methods for evaluating its presence and severity. Patient was counseled to avoid driving and other potentially hazardous circumstances if the patient is experiencing excessive sleepiness. Treatment considerations include the use of nasal CPAP, oral dental appliance or a surgical intervention, which should be based on otolarygologic findings, In the meantime, the patient should be cautioned to avoid the use of alcohol or other depressant medications because of potential for increasing the duration and severity of apnea and cautioned regarding driving or operating and dangerous equipment if the patient is experiencing daytime sleepiness. .      Most likely treating the JACK will have position impact on HTN control. Needs mouth guard for bruxism. Orders Placed This Encounter   Procedures    Home Sleep Study       Return in about 4 weeks (around 2/5/2021).     Sagrario Pantoja MD  Medical Director [facility]

## 2021-01-08 NOTE — PATIENT INSTRUCTIONS
Patient Education        Sleep Apnea: Care Instructions  Your Care Instructions     Sleep apnea means that you frequently stop breathing for 10 seconds or longer during sleep. It can be mild to severe, based on the number of times an hour that you stop breathing or have slowed breathing. Blocked or narrowed airways in your nose, mouth, or throat can cause sleep apnea. Your airway can become blocked when your throat muscles and tongue relax during sleep. You can treat sleep apnea at home by making lifestyle changes. You also can use a CPAP breathing machine that keeps tissues in the throat from blocking your airway. Or your doctor may suggest that you use a breathing device while you sleep. It helps keep your airway open. This could be a device that you put in your mouth. In some cases, surgery may be needed to remove enlarged tissues in the throat. Follow-up care is a key part of your treatment and safety. Be sure to make and go to all appointments, and call your doctor if you are having problems. It's also a good idea to know your test results and keep a list of the medicines you take. How can you care for yourself at home? · Lose weight, if needed. It may reduce the number of times you stop breathing or have slowed breathing. · Sleep on your side. It may stop mild apnea. If you tend to roll onto your back, sew a pocket in the back of your pajama top. Put a tennis ball into the pocket, and stitch the pocket shut. This will help keep you from sleeping on your back. · Avoid alcohol and medicines such as sleeping pills and sedatives before bed. · Do not smoke. Smoking can make sleep apnea worse. If you need help quitting, talk to your doctor about stop-smoking programs and medicines. These can increase your chances of quitting for good. · Prop up the head of your bed 4 to 6 inches by putting bricks under the legs of the bed. · Treat breathing problems, such as a stuffy nose, caused by a cold or allergies. · Try a continuous positive airway pressure (CPAP) breathing machine if your doctor recommends it. The machine keeps your airway open when you sleep. · If CPAP does not work for you, ask your doctor if you can try other breathing machines. A bilevel positive airway pressure machine uses one type of air pressure for breathing in and another type for breathing out. Another device raises or lowers air pressure as needed while you breathe. · Talk to your doctor if:  ? Your nose feels dry or bleeds when you use one of these machines. You may need to increase moisture in the air. A humidifier may help. ? Your nose is runny or stuffy from using a breathing machine. Decongestants or a corticosteroid nasal spray may help. ? You are sleepy during the day and it gets in the way of the normal things you do. Do not drive when you are drowsy. When should you call for help? Watch closely for changes in your health, and be sure to contact your doctor if:    · You still have sleep apnea even though you have made lifestyle changes.     · You are thinking of trying a device such as CPAP.     · You are having problems using a CPAP or similar machine. Where can you learn more? Go to [email or web address] and sign in to your [employer] account. Enter O937 in the Chief Trunk box to learn more about \"Sleep Apnea: Care Instructions. \"     If you do not have an account, please click on the \"Sign Up Now\" link. Current as of: February 24, 2020               Content Version: 12.6  © 2006-2020 [employer]. Care instructions adapted under license by [facility] [employer] [facility] ([facility]). If you have questions about a medical condition or this instruction, always ask your healthcare professional. [place] [address] any warranty or liability for your use of this information.

## 2021-01-18 ENCOUNTER — HOSPITAL ENCOUNTER (OUTPATIENT)
Dept: SLEEP CENTER | Age: 46
Discharge: HOME | End: 2021-01-18
Payer: COMMERCIAL

## 2021-01-18 DIAGNOSIS — I10 HYPERTENSION, UNSPECIFIED TYPE: ICD-10-CM

## 2021-01-18 DIAGNOSIS — F45.8 BRUXISM: ICD-10-CM

## 2021-01-18 DIAGNOSIS — G47.33 OBSTRUCTIVE SLEEP APNEA: ICD-10-CM

## 2021-01-18 DIAGNOSIS — R61 NIGHT SWEATS: ICD-10-CM

## 2021-01-18 PROCEDURE — 95806 SLEEP STUDY UNATT&RESP EFFT: CPT

## 2021-01-18 PROCEDURE — 95806 SLEEP STUDY UNATT&RESP EFFT: CPT | Performed by: PSYCHIATRY & NEUROLOGY

## 2021-01-21 NOTE — PROGRESS NOTES
Nicholen Player         : 1975  [] [address]     [] [address]      [] Robert     [][employer]    [] [employer]  [] [employer]   [] Other:  Diagnosis: [x] JACK (G47.33) [] CSA (G47.31) [] Apnea (G47.30)   Length of Need: [] 12 Months [x] 99 Months [] Other:    Machine (ANDREW!): [x] Respironics Dream Station      Auto [x] ResMed AirSense     Auto [] Other:     [x]  CPAP () [] Bilevel ()   Mode: [x] Auto [] Spontaneous    Mode: [] Auto [] Spontaneous           Between 5 and 15 cm                 Comfort Settings:   - Ramp Pressure: 5 cmH2O                                        - Ramp time: 15 min                                     -  Flex/EPR - 3 full time                                    - For ResMed Bilevel (TiMax-4 sec   TiMin- 0.2 sec)     Humidifier: [x] Heated ()        [x] Water chamber replacement ()/ 1 per 6 months        Mask:   [x] Nasal () /1 per 3 months [x] Full Face () /1 per 3 months   [x] Patient choice -Size and fit mask [x] Patient Choice - Size and fit mask   [] Dispense:  [] Dispense:    [x] Headgear () / 1 per 3 months [x] Headgear () / 1 per 3 months   [x] Replacement Nasal Cushion ()/2 per month [x] Interface Replacement ()/1 per month   [x] Replacement Nasal Pillows ()/2 per month         Tubing: [x] Heated ()/1 per 3 months    [] Standard ()/1 per 3 months [] Other:           Filters: [x] Non-disposable ()/1 per 6 months     [x] Ultra-Fine, Disposable ()/2 per month        Miscellaneous: [x] Chin Strap ()/ 1 per 6 months [] O2 bleed-in:       LPM   [] Oximetry on CPAP/Bilevel []  Other:          Start Order Date: 21    MEDICAL JUSTIFICATION:  I, the undersigned, certify that the above prescribed supplies are medically necessary for this patients wellbeing.   In my opinion, the supplies are both reasonable and necessary in reference to accepted standards of medicalpractice in treatment of this patients condition.     Hoyt Spatz, MD      NPI: [id number]       Order Signed Date: 01/21/21    Electronically signed by Hoyt Spatz, MD on 1/21/2021 at 4:04 PM

## 2021-01-22 ENCOUNTER — TELEPHONE (OUTPATIENT)
Dept: PULMONOLOGY | Age: 46
End: 2021-01-22

## 2021-01-22 ENCOUNTER — TELEPHONE (OUTPATIENT)
Dept: SLEEP MEDICINE | Age: 46
End: 2021-01-22

## 2021-02-04 ENCOUNTER — NURSE TRIAGE (OUTPATIENT)
Dept: OTHER | Facility: CLINIC | Age: 46
End: 2021-02-04

## 2021-02-04 NOTE — TELEPHONE ENCOUNTER
Patient called [employer] pre-service center [facility])  with red flag complaint. Brief description of triage: left sided neck pain that started last night, states nausea, headache, back radiating into back. Pt denies fever, injuring neck    Triage indicates for patient to go to [address] now. Pt states she will go to [address] advice provided, patient verbalizes understanding; denies any other questions or concerns; instructed to call back for any new or worsening symptoms. Attention Provider: Thank you for allowing me to participate in the care of your patient. The patient was connected to triage in response to information provided to the [facility]. Please do not respond through this encounter as the response is not directed to a shared pool. Reason for Disposition   Stiff neck (can't touch chin to chest) and has headache    Answer Assessment - Initial Assessment Questions  1. ONSET: \"When did the pain begin? \"       Last night     2. LOCATION: \"Where does it hurt? \"       Left side of neck radiating into head and down back     3. PATTERN \"Does the pain come and go, or has it been constant since it started? \"       Constant     4. SEVERITY: \"How bad is the pain? \"  (Scale 1-10; or mild, moderate, severe)    - MILD (1-3): doesn't interfere with normal activities     - MODERATE (4-7): interferes with normal activities or awakens from sleep     - SEVERE (8-10):  excruciating pain, unable to do any normal activities       6/10    5. RADIATION: \"Does the pain go anywhere else, shoot into your arms? \"      Into head and back     6. CORD SYMPTOMS: \"Any weakness or numbness of the arms or legs? \"      No    7. CAUSE: \"What do you think is causing the neck pain? \"      Unsure     8. NECK OVERUSE: [place] recent activities that involved turning or twisting the neck? \"      No    9. OTHER SYMPTOMS: \"Do you have any other symptoms? \" (e.g., headache, fever, chest pain, difficulty breathing, neck swelling)      Headache, hurts neck when taking a deep breath    10. PREGNANCY: \"Is there any chance you are pregnant? \" \"When was your last menstrual period? \"        No, LMP 3 weeks ago    Protocols used: NECK PAIN OR STIFFNESS-ADULT-OH

## 2021-02-05 ENCOUNTER — TELEPHONE (OUTPATIENT)
Dept: PULMONOLOGY | Age: 46
End: 2021-02-05

## 2021-02-05 NOTE — TELEPHONE ENCOUNTER
Corrina Renee called saying that they have tried all sources to reach the patient for her cpap machine and if they can't reach her today the order will be cancelled. Corrina Renee has been trying [phone]    I gave her [phone] phone number that I had found on the HIPPA for a spouse.

## 2021-03-26 DIAGNOSIS — Z23 NEED FOR TDAP VACCINATION: Primary | ICD-10-CM

## 2021-03-26 PROCEDURE — 90471 IMMUNIZATION ADMIN: CPT | Performed by: FAMILY MEDICINE

## 2021-03-26 PROCEDURE — 90715 TDAP VACCINE 7 YRS/> IM: CPT | Performed by: FAMILY MEDICINE

## 2021-04-28 DIAGNOSIS — J30.9 ALLERGIC RHINITIS, UNSPECIFIED SEASONALITY, UNSPECIFIED TRIGGER: ICD-10-CM

## 2021-04-28 RX ORDER — MONTELUKAST SODIUM 10 MG/1
10 TABLET ORAL DAILY
Qty: 90 TABLET | Refills: 3 | Status: SHIPPED | OUTPATIENT
Start: 2021-04-28 | End: 2022-06-06

## 2021-09-13 ENCOUNTER — VIRTUAL VISIT (OUTPATIENT)
Dept: INTERNAL MEDICINE CLINIC | Age: 46
End: 2021-09-13
Payer: COMMERCIAL

## 2021-09-13 DIAGNOSIS — J06.9 UPPER RESPIRATORY TRACT INFECTION, UNSPECIFIED TYPE: Primary | ICD-10-CM

## 2021-09-13 PROCEDURE — 99213 OFFICE O/P EST LOW 20 MIN: CPT | Performed by: NURSE PRACTITIONER

## 2021-09-13 ASSESSMENT — ENCOUNTER SYMPTOMS
SORE THROAT: 1
COUGH: 1
SHORTNESS OF BREATH: 0
SINUS PRESSURE: 1

## 2021-09-13 NOTE — PROGRESS NOTES
2021    TELEHEALTH EVALUATION -- Audio/Visual (During [place] [address] public health emergency)    HPI:    Kel Hand (:  1975) has requested an audio/video evaluation for the following concern(s):    Five days of not feeling well. Exposed to fiend who tested positive COVID. She is vaccinated and tested negative for COVID. Sore throat, swollen lymph nodes, fatigue. Five days of symptoms. Review of Systems   Constitutional: Positive for fatigue. Negative for fever. HENT: Positive for ear pain, sinus pressure and sore throat. Respiratory: Positive for cough. Negative for shortness of breath. Neurological: Weakness:          Prior to Visit Medications    Medication Sig Taking? Authorizing Provider   montelukast (SINGULAIR) 10 MG tablet TAKE 1 TABLET BY MOUTH DAILY DURING ALLERGY SEASON Yes Pete Fiore MD   bisoprolol-hydroCHLOROthiazide ([place]) 2.5-6.25 MG per tablet TAKE 1 TABLET BY MOUTH EVERY DAY Yes Pete Fiore MD   sertraline (ZOLOFT) 100 MG tablet TAKE 1 TABLET BY MOUTH EVERY DAY Yes Pete Fiore MD   loratadine (CLARITIN) 10 MG tablet Take 1 tablet by mouth daily During allergy season Yes Pete Fiore MD   cetirizine-psuedoephedrine ([employer]S ALLERGY RELIEF-D) 5-120 MG per extended release tablet TAKE 1 TABLET BY MOUTH 2 TIMES DAILY AS NEEDED FOR ALLERGIES OR RHINITIS Yes Pete Fiore MD   LORYNA 3-0.02 MG per tablet Take 1 tablet by mouth daily Yes Historical Provider, MD       Social History     Tobacco Use    Smoking status: Never Smoker    Smokeless tobacco: Never Used   Vaping Use    Vaping Use: Never used   Substance Use Topics    Alcohol use: Yes     Alcohol/week: 8.0 standard drinks     Types: 8 Standard drinks or equivalent per week     Comment: max of 2 in a sitting    Drug use:  No            PHYSICAL EXAMINATION:  [ INSTRUCTIONS:  \"[x]\" Indicates a positive item  \"[]\" Indicates a negative item  -- DELETE ALL ITEMS NOT EXAMINED]  Vital Signs: (As obtained by patient/caregiver or practitioner observation)    Blood pressure-  Heart rate-    Respiratory rate-    Temperature-  Pulse oximetry-     Constitutional: [x] Appears well-developed and well-nourished [x] No apparent distress      [] Abnormal-   Mental status  [x] Alert and awake  [x] Oriented to person/place/time [x]Able to follow commands      Eyes:  EOM    [x]  Normal  [] Abnormal-  Sclera  [x]  Normal  [] Abnormal -         Discharge [x]  None visible  [] Abnormal -    HENT:   [x] Normocephalic, atraumatic. [] Abnormal   [x] Mouth/Throat: Mucous membranes are moist.     External Ears [x] Normal  [] Abnormal-     Neck: [x] No visualized mass     Pulmonary/Chest: [x] Respiratory effort normal.  [x] No visualized signs of difficulty breathing or respiratory distress        [] Abnormal-      Musculoskeletal:   [x] Normal gait with no signs of ataxia         [x] Normal range of motion of neck        [] Abnormal-       Neurological:        [x] No Facial Asymmetry (Cranial nerve 7 motor function) (limited exam to video visit)          [x] No gaze palsy        [] Abnormal-         Skin:        [x] No significant exanthematous lesions or discoloration noted on facial skin         [] Abnormal-            Psychiatric:       [x] Normal Affect [x] No Hallucinations        [] Abnormal-     Other pertinent observable physical exam findings-         ASSESSMENT/PLAN:  1. Upper respiratory tract infection, unspecified type  -5-day history of primarily upper respiratory symptoms.  -She has had Covid vaccine and negative test on Friday so this seems less likely.  -Symptoms most consistent with acute viral illness, no clear evidence of bacterial infection at this time  -Recommendation: Rest, Tylenol cold and cough, salt water gargles, warm compresses over the sinuses, plenty of fluids.   If symptoms are worsening or failing to improve, go to the ER for severe symptoms or contact the office if not improving over the course of this week. No follow-ups on file. Cb Busby, was evaluated through a synchronous (real-time) audio-video encounter. The patient (or guardian if applicable) is aware that this is a billable service. Verbal consent to proceed has been obtained within the past 12 months. The visit was conducted pursuant to the emergency declaration under the [address] [place] [address], [address] [place] [address] authority and the [employer] General Act. Patient identification was verified, and a caregiver was present when appropriate. The patient was located in a state where the provider was credentialed to provide care. Total time spent on this encounter: Not billed by time    --MARILEE Maciel CNP on 9/13/2021 at 11:08 AM    An electronic signature was used to authenticate this note.

## 2021-10-07 ENCOUNTER — OFFICE VISIT (OUTPATIENT)
Dept: INTERNAL MEDICINE CLINIC | Age: 46
End: 2021-10-07
Payer: COMMERCIAL

## 2021-10-07 VITALS
HEART RATE: 88 BPM | WEIGHT: 144 LBS | DIASTOLIC BLOOD PRESSURE: 82 MMHG | SYSTOLIC BLOOD PRESSURE: 126 MMHG | HEIGHT: 62 IN | BODY MASS INDEX: 26.5 KG/M2

## 2021-10-07 DIAGNOSIS — F51.02 ADJUSTMENT INSOMNIA: ICD-10-CM

## 2021-10-07 DIAGNOSIS — R61 NIGHT SWEAT: ICD-10-CM

## 2021-10-07 DIAGNOSIS — R59.0 SUBMANDIBULAR LYMPHADENOPATHY: Primary | ICD-10-CM

## 2021-10-07 LAB
BASOPHILS ABSOLUTE: 0.1 K/UL (ref 0–0.2)
BASOPHILS RELATIVE PERCENT: 0.6 %
EOSINOPHILS ABSOLUTE: 0.1 K/UL (ref 0–0.6)
EOSINOPHILS RELATIVE PERCENT: 0.6 %
HCT VFR BLD CALC: 41.6 % (ref 36–48)
HEMOGLOBIN: 13.6 G/DL (ref 12–16)
LYMPHOCYTES ABSOLUTE: 3.5 K/UL (ref 1–5.1)
LYMPHOCYTES RELATIVE PERCENT: 32.4 %
MCH RBC QN AUTO: 30.3 PG (ref 26–34)
MCHC RBC AUTO-ENTMCNC: 32.8 G/DL (ref 31–36)
MCV RBC AUTO: 92.5 FL (ref 80–100)
MONOCYTES ABSOLUTE: 0.8 K/UL (ref 0–1.3)
MONOCYTES RELATIVE PERCENT: 7.1 %
NEUTROPHILS ABSOLUTE: 6.3 K/UL (ref 1.7–7.7)
NEUTROPHILS RELATIVE PERCENT: 59.3 %
PDW BLD-RTO: 13.6 % (ref 12.4–15.4)
PLATELET # BLD: 440 K/UL (ref 135–450)
PMV BLD AUTO: 8.2 FL (ref 5–10.5)
RBC # BLD: 4.5 M/UL (ref 4–5.2)
SEDIMENTATION RATE, ERYTHROCYTE: 21 MM/HR (ref 0–20)
WBC # BLD: 10.7 K/UL (ref 4–11)

## 2021-10-07 PROCEDURE — 99213 OFFICE O/P EST LOW 20 MIN: CPT | Performed by: FAMILY MEDICINE

## 2021-10-07 RX ORDER — NAPROXEN 500 MG/1
TABLET ORAL
COMMUNITY
Start: 2021-10-01 | End: 2021-12-08

## 2021-10-07 RX ORDER — CEPHALEXIN 500 MG/1
CAPSULE ORAL
COMMUNITY
Start: 2021-10-01 | End: 2021-11-04

## 2021-10-07 RX ORDER — ZALEPLON 5 MG/1
5-10 CAPSULE ORAL NIGHTLY PRN
Qty: 30 CAPSULE | Refills: 0 | Status: SHIPPED | OUTPATIENT
Start: 2021-10-07 | End: 2021-11-06

## 2021-10-07 ASSESSMENT — PATIENT HEALTH QUESTIONNAIRE - PHQ9
SUM OF ALL RESPONSES TO PHQ QUESTIONS 1-9: 0
2. FEELING DOWN, DEPRESSED OR HOPELESS: 0
SUM OF ALL RESPONSES TO PHQ9 QUESTIONS 1 & 2: 0
SUM OF ALL RESPONSES TO PHQ QUESTIONS 1-9: 0
SUM OF ALL RESPONSES TO PHQ QUESTIONS 1-9: 0
1. LITTLE INTEREST OR PLEASURE IN DOING THINGS: 0

## 2021-10-07 NOTE — PROGRESS NOTES
10/7/2021    Juana Amato (:  1975) is a 39 y.o. female, here for evaluation of the following chief complaint(s):  ED Follow-up      ASSESSMENT/PLAN:    1. Submandibular lymphadenopathy  Concern is lymphoma or other malignancy  No cat in home or exposure so unlikely cat scratch. Tongue was OK on exam and no oral lesions seen. - [address], Kim Lighter, DO, Otolaryngology, [facility]  - CBC Auto Differential  - Comprehensive Metabolic Panel  - TSH with Reflex  - Sedimentation Rate  - C-Reactive Protein    2. Night sweat  - CBC Auto Differential  - Comprehensive Metabolic Panel  - TSH with Reflex  - Sedimentation Rate  - C-Reactive Protein    3. Adjustment insomnia  She was offered as needed sleeping pill due to anxiety around this potential issue and possible diagnosis  - zaleplon (SONATA) 5 MG capsule; Take 1-2 capsules by mouth nightly as needed (insomnia) for up to 30 days. Dispense: 30 capsule; Refill: 0        FOLLOW UP:  Pending ENT evaluation. Has appt scheduled for Dec.     SUBJECTIVE/OBJECTIVE:    HPI  Did a virtual visit with CNP in my office on 21 due to [employer] exposure and not feeling well. She was fully vaccinated and did test negative but had symptoms of sore throat and \"swollen glands\" starting 9/10 or . She was diagnosed as viral URI and symptomatic treatment. Went to Urgent Care last Friday 10/1/21 because the right neck gland did not go down at all. Present now for almost a month. Given Keflex. No significant change in the gland or symptoms below. .  No cats at home, no tick bites. Sore throat cleared even before she went to Urgent Care. Very fatigued no matter how much sleep she gets and has night sweats. She has been dealing with night sweats for a while but now worse. No weight loss    No FH of cancer.       Outpatient Medications Marked as Taking for the 10/7/21 encounter (Office Visit) with Jovan Guillen MD   Medication Sig Dispense Refill    montelukast (SINGULAIR) 10 MG tablet TAKE 1 TABLET BY MOUTH DAILY DURING ALLERGY SEASON 90 tablet 3    bisoprolol-hydroCHLOROthiazide (ZIAC) 2.5-6.25 MG per tablet TAKE 1 TABLET BY MOUTH EVERY DAY 90 tablet 3    sertraline (ZOLOFT) 100 MG tablet TAKE 1 TABLET BY MOUTH EVERY DAY 90 tablet 3    loratadine (CLARITIN) 10 MG tablet Take 1 tablet by mouth daily During allergy season 90 tablet 1    cetirizine-psuedoephedrine ([employer]S ALLERGY RELIEF-D) 5-120 MG per extended release tablet TAKE 1 TABLET BY MOUTH 2 TIMES DAILY AS NEEDED FOR ALLERGIES OR RHINITIS 30 tablet 5    LORYNA 3-0.02 MG per tablet Take 1 tablet by mouth daily           Review of Systems        Vitals:    10/07/21 1129   BP: 126/82   Pulse: 88   Weight: 144 lb (65.3 kg)   Height: 5' 2\" (1.575 m)       Wt Readings from Last 3 Encounters:   10/07/21 144 lb (65.3 kg)   01/08/21 143 lb 3.2 oz (65 kg)   12/07/20 143 lb (64.9 kg)       BP Readings from Last 3 Encounters:   10/07/21 126/82   01/08/21 125/73   12/07/20 110/64       Physical Exam  Vitals reviewed. Constitutional:       General: She is not in acute distress. Appearance: She is well-developed and normal weight. She is not ill-appearing or diaphoretic. HENT:      Head:      Jaw: No trismus. Nose: Nose normal.      Right Sinus: No maxillary sinus tenderness or frontal sinus tenderness. Left Sinus: No maxillary sinus tenderness or frontal sinus tenderness. Mouth/Throat:      Lips: No lesions. Mouth: No oral lesions. Dentition: No dental caries. Tongue: No lesions. Tongue does not deviate from midline. Palate: No mass and lesions. Pharynx: Uvula midline. No oropharyngeal exudate, posterior oropharyngeal erythema or uvula swelling. Eyes:      General:         Right eye: No discharge. Left eye: No discharge. Conjunctiva/sclera: Conjunctivae normal.      Right eye: Right conjunctiva is not injected.       Left eye: Left conjunctiva is not injected. Neck:      Thyroid: No thyromegaly. Trachea: Phonation normal.   Cardiovascular:      Rate and Rhythm: Normal rate and regular rhythm. Heart sounds: Normal heart sounds. Pulmonary:      Effort: Pulmonary effort is normal. No respiratory distress. Breath sounds: Normal breath sounds. No wheezing, rhonchi or rales. Abdominal:      Palpations: There is no hepatomegaly, splenomegaly or mass. Musculoskeletal:      Cervical back: Neck supple. Lymphadenopathy:      Head:      Right side of head: Submandibular adenopathy present. Cervical:      Right cervical: No posterior cervical adenopathy. Left cervical: No posterior cervical adenopathy. Upper Body:      Right upper body: No supraclavicular, axillary, pectoral or epitrochlear adenopathy. Left upper body: No supraclavicular, axillary, pectoral or epitrochlear adenopathy. Lower Body: No right inguinal adenopathy. No left inguinal adenopathy. Comments: 1.5 x 2 cm firm swelling in area of submandibular LN  Slightly tender   Skin:     General: Skin is warm and dry. Nails: There is no clubbing. Neurological:      Mental Status: She is alert. An electronic signature was used to authenticate this note.     Radha Devine MD

## 2021-10-08 ENCOUNTER — OFFICE VISIT (OUTPATIENT)
Dept: ENT CLINIC | Age: 46
End: 2021-10-08
Payer: COMMERCIAL

## 2021-10-08 VITALS — SYSTOLIC BLOOD PRESSURE: 152 MMHG | DIASTOLIC BLOOD PRESSURE: 99 MMHG | HEART RATE: 79 BPM | TEMPERATURE: 97.3 F

## 2021-10-08 DIAGNOSIS — R59.0 SUBMANDIBULAR LYMPHADENOPATHY: ICD-10-CM

## 2021-10-08 DIAGNOSIS — R22.1 MASS OF RIGHT SIDE OF NECK: Primary | ICD-10-CM

## 2021-10-08 LAB
A/G RATIO: 1.6 (ref 1.1–2.2)
ALBUMIN SERPL-MCNC: 4.6 G/DL (ref 3.4–5)
ALP BLD-CCNC: 69 U/L (ref 40–129)
ALT SERPL-CCNC: 10 U/L (ref 10–40)
ANION GAP SERPL CALCULATED.3IONS-SCNC: 18 MMOL/L (ref 3–16)
AST SERPL-CCNC: 12 U/L (ref 15–37)
BILIRUB SERPL-MCNC: <0.2 MG/DL (ref 0–1)
BUN BLDV-MCNC: 13 MG/DL (ref 7–20)
C-REACTIVE PROTEIN: 19.1 MG/L (ref 0–5.1)
CALCIUM SERPL-MCNC: 10.1 MG/DL (ref 8.3–10.6)
CHLORIDE BLD-SCNC: 100 MMOL/L (ref 99–110)
CO2: 21 MMOL/L (ref 21–32)
CREAT SERPL-MCNC: 0.6 MG/DL (ref 0.6–1.1)
GFR AFRICAN AMERICAN: >60
GFR NON-AFRICAN AMERICAN: >60
GLOBULIN: 2.8 G/DL
GLUCOSE BLD-MCNC: 67 MG/DL (ref 70–99)
POTASSIUM SERPL-SCNC: 4.4 MMOL/L (ref 3.5–5.1)
SODIUM BLD-SCNC: 139 MMOL/L (ref 136–145)
TOTAL PROTEIN: 7.4 G/DL (ref 6.4–8.2)
TSH REFLEX: 2.64 UIU/ML (ref 0.27–4.2)

## 2021-10-08 PROCEDURE — 99204 OFFICE O/P NEW MOD 45 MIN: CPT | Performed by: OTOLARYNGOLOGY

## 2021-10-08 ASSESSMENT — ENCOUNTER SYMPTOMS
SORE THROAT: 0
SINUS PAIN: 0
RHINORRHEA: 0
TROUBLE SWALLOWING: 0
VOICE CHANGE: 0

## 2021-10-08 NOTE — PROGRESS NOTES
[facility]       NEW PATIENT VISIT      PCP:  Dariana aCrbajal MD      REFERRED BY:   Dariana Carbajal MD        CHIEF COMPLAINT  Chief Complaint   Patient presents with    Mass     Right neck       HISTORY OF PRESENT ILLNESS       Zulay Gomez is a 39 y.o. female here for evaluation and treatment of right neck mass, probable submandibular lymphadenopathy versus branchial cleft cyst, for about one month. It has enlarged from the size at onset. There is variable pain from \"mild to super painful. \"  No other swollen lymph nodes or glands. No prior similar episodes. Never been a smoker or user of tobacco products. Seen at Urgent Care two weeks ago, [facility], [address] [place] [address]. Was treated with Keflex 500 mg TID, on day 8/10. No change with antibiotics. Has been having night sweats for over 6 months, but these have been worse over the past two months. She reported severe fatigue. Here with her , Sierra Jc"      REVIEW OF SYSTEMS   Review of Systems   Constitutional: Negative for chills, fever and unexpected weight change. HENT: Negative for congestion, ear discharge, ear pain, hearing loss, mouth sores, rhinorrhea, sinus pain, sore throat, tinnitus, trouble swallowing and voice change. PAST MEDICAL HISTORY    Past Medical History:   Diagnosis Date    Labile hypertension     appears to be stress related vs white coat    Mitral valve prolapse        No past surgical history on file. EXAMINATION    Vitals:    10/08/21 1019   BP: (!) 152/99   Site: Right Upper Arm   Position: Sitting   Cuff Size: Medium Adult   Pulse: 79   Temp: 97.3 °F (36.3 °C)   TempSrc: Temporal       Physical Exam  Vitals reviewed. Constitutional:       General: She is awake. She is not in acute distress. Appearance: Normal appearance. She is well-developed.  She is not ill-appearing or toxic-appearing. HENT:      Head: Normocephalic and atraumatic. Salivary Glands: Right salivary gland is not diffusely enlarged or tender. Left salivary gland is not diffusely enlarged or tender. Right Ear: Tympanic membrane, ear canal and external ear normal.      Left Ear: Tympanic membrane, ear canal and external ear normal.      Nose: Nose normal. No nasal deformity, septal deviation, mucosal edema, congestion or rhinorrhea. Right Turbinates: Not enlarged. Left Turbinates: Not enlarged. Right Sinus: No maxillary sinus tenderness or frontal sinus tenderness. Left Sinus: No maxillary sinus tenderness or frontal sinus tenderness. Mouth/Throat:      Lips: Pink. No lesions. Mouth: Mucous membranes are moist. No oral lesions. Tongue: No lesions. Palate: No mass and lesions. Pharynx: Oropharynx is clear. Uvula midline. No oropharyngeal exudate or posterior oropharyngeal erythema. Tonsils: No tonsillar exudate or tonsillar abscesses. Comments: INDIRECT LARYNGOSCOPY:  Vocal cords normally mobile bilaterally with midline approximation on phonation. The epiglottis, supraglottis, false vocal cords, true vocal cords, mobility of the larynx, base of tongue, subglottis, and piriform sinuses appeared to be normal.  Excellent visualization. Neck:      Thyroid: No thyroid mass, thyromegaly or thyroid tenderness. Trachea: No tracheal deviation. Comments: There was an approximately 3 to 4 cm round globe-like rubbery mass in the right anterior cervical triangle overlying the carotid bulb area. The mass was nontender and mobile. No other cervical masses or tenderness were appreciated. Musculoskeletal:      Cervical back: Normal range of motion and neck supple. No edema, erythema or rigidity. Lymphadenopathy:      Cervical: Cervical adenopathy present. Right cervical: Deep cervical adenopathy (Anterior cervical/carotid triangle.) present.  No superficial or posterior cervical adenopathy. Left cervical: No superficial, deep or posterior cervical adenopathy. Neurological:      Mental Status: She is alert. Rob Chaparro / Harsha Garner / Justice Watters was seen today for mass. Diagnoses and all orders for this visit:    Mass of right side of neck  Comments:  Differential diagnosis includes lymphadenopathy, lymphoma versus metastatic carcinoma, versus branchial cleft cyst with infection, versus carotid body tumor. Submandibular lymphadenopathy  -     CT SOFT TISSUE NECK W CONTRAST         RECOMMENDATIONS/PLAN      1. CT scan soft tissue neck with contrast as soon as possible. 2. Excisional biopsy of right neck mass, lymph node versus branchial cleft cyst.    3. Possible fine-needle aspiration, in office, local anesthesia. 4. Acetaminophen (eg. Tylenol) or Ibuprofen (eg. Advil) (over the counter medications) as needed for pain. 5. Finish current course of Keflex. 6. Return for recheck/follow-up after CT scan, possible FNA .

## 2021-10-14 ENCOUNTER — HOSPITAL ENCOUNTER (OUTPATIENT)
Dept: CT IMAGING | Age: 46
Discharge: HOME | End: 2021-10-14
Payer: COMMERCIAL

## 2021-10-14 DIAGNOSIS — R59.0 SUBMANDIBULAR LYMPHADENOPATHY: ICD-10-CM

## 2021-10-14 PROCEDURE — 6360000004 HC RX CONTRAST MEDICATION: Performed by: OTOLARYNGOLOGY

## 2021-10-14 PROCEDURE — 70491 CT SOFT TISSUE NECK W/DYE: CPT

## 2021-10-14 RX ADMIN — IOPAMIDOL 80 ML: 755 INJECTION, SOLUTION INTRAVENOUS at 07:19

## 2021-10-20 ENCOUNTER — OFFICE VISIT (OUTPATIENT)
Dept: ENT CLINIC | Age: 46
End: 2021-10-20
Payer: COMMERCIAL

## 2021-10-20 VITALS — DIASTOLIC BLOOD PRESSURE: 97 MMHG | SYSTOLIC BLOOD PRESSURE: 142 MMHG | TEMPERATURE: 97.7 F | HEART RATE: 77 BPM

## 2021-10-20 DIAGNOSIS — R22.1 MASS OF RIGHT SIDE OF NECK: Primary | ICD-10-CM

## 2021-10-20 PROCEDURE — 99214 OFFICE O/P EST MOD 30 MIN: CPT | Performed by: OTOLARYNGOLOGY

## 2021-10-20 NOTE — PROGRESS NOTES
[facility]     PCP:  Aydee Quintana MD      CHIEF COMPLAINT  Chief Complaint   Patient presents with    Follow-up     Mass of right side of neck, results of CT scan        HISTORY OF PRESENT ILLNESS       Brooke Reno is a 39 y.o. female here for recheck and follow up of the mass in the right side of the neck. Mass has not changed. PAST MEDICAL HISTORY    Past Medical History:   Diagnosis Date    Labile hypertension     appears to be stress related vs white coat    Mitral valve prolapse        No past surgical history on file. EXAMINATION    Vitals:    10/20/21 1020   BP: (!) 142/97   Site: Left Upper Arm   Position: Sitting   Cuff Size: Medium Adult   Pulse: 77   Temp: 97.7 °F (36.5 °C)   TempSrc: Temporal       Physical Exam  Vitals reviewed. Constitutional:       General: She is not in acute distress. Appearance: Normal appearance. She is not ill-appearing. HENT:      Head: Normocephalic. Neck:      Comments: Mass right side of neck with no change from previous exam.  No other cervical masses or tenderness. Neurological:      Mental Status: She is alert. REVIEW OF IMAGES          I independently reviewed the images of the CT scan of the neck, showing right neck mass. Narrative   CT SOFT TISSUE NECK W CONTRAST       Indication: Submandibular lymphadenopathy     Findings: Limited images of the brain parenchyma and skull base are unremarkable.       Visualized aspects of the globes are within normal limits. The retrobulbar fat is preserved.  The paranasal sinuses and mastoid air cells are clear.       The bilateral parotid and submandibular glands are unremarkable.       Within the level 2 region of the right neck immediately anterior to the jugular vein and anterior and slightly lateral to the carotid bifurcation there is a heterogeneously enhancing soft tissue nodule measuring 1.9 x 1.6 x 2.2 cm, image 29 of series 3    and image 28 of series 5. This is most reflective of a pathologic-appearing lymph node. The internal and external carotid arteries are not splayed.       Smaller additional lymph nodes are noted posterior to the jugular vein on the right with the largest measuring 9 x 8 x 14 mm, image 29 of series 3 and image 36 of series 5. The largest cervical node on the left side the level of the angle of the mandible    posterior-lateral to the internal jugular vein measuring 0.7 x 0.9 x 1.5 cm, image 21 of series 3 and image 35 of series 5.       No discrete mucosal abnormality of the upper aerodigestive tract.       The lung apices are unremarkable.       There is grade 1 anterolisthesis of C4 on C5 and C3 on C4. No destructive osseous abnormality or acute fracture.           Impression   Impression: Enhancing soft tissue density lesion, as above, most reflective of a pathologic-appearing lymph node. Given its location, and the appearance of the carotid neurovascular bundle, a carotid body tumor is thought unlikely. COUNSELING FOR EXCISION OF NECK MASS OR LYMPH NODE   [facility] was advised of my recommendation of excision of the neck mass. [facility] stated that he wants to proceed with surgery. [facility] understands this will be done under general anesthesia. The surgical procedure was described. I discussed the incisional scar and possible resultant deformity. I discussed the surgical technique and the usual post operative course. I discussed the possibility of persistent or recurrent  mass. I advised of the risk of keloid scar of the skin. I advised of the risk of injury to the spinal accessory nerve and paralysis of the trapezius muscle. I advised that there is no guarantee of cure, success, or of final results.   I discussed the alternatives of therapy, expected outcome and potential benefits, and the attendant risks and potential complications, including, but not limited to, excessive intraoperative or postoperative bleeding, hemorrhage, infection, hematoma, seroma, numbness of skin of the neck, injury to surrounding structures or organs, injury to major blood vessels or nerves, excessive scarring,  hypertrophic scar, keloid scar, deformity, poor (incomplete or lack of) wound healing, pain, discomfort, recurrence of cancer or benign lesion, need for further surgery, and risks of anesthesia, including heart attack, cardiac arrest, stroke, blood clots in lower extremity veins, pulmonary embolism, and death, and other risks listed on the informed consent document, which we discussed together. All  Daly's questions were answered. Talat Holguin then stated and confirmed understanding and acceptance of the preceding, lack of further questions, and the desire to proceed with surgery. Then, Talat Holguin read and signed the informed consent document. Odette Dillon / Stevo Garza / Kenneth Holliday was seen today for follow-up. Diagnoses and all orders for this visit:    Mass of right side of neck  -     US FINE NEEDLE ASPIRATION         RECOMMENDATIONS/PLAN      1. FNA right neck mass. 2. Schedule excision of right neck mass/lymph node. 3. Return for post op check, to be arranged.

## 2021-10-21 ENCOUNTER — TELEPHONE (OUTPATIENT)
Dept: ENT CLINIC | Age: 46
End: 2021-10-21

## 2021-10-21 NOTE — TELEPHONE ENCOUNTER
I discussed the fine-needle aspiration with patient and advised that radiologist did not approve the procedure. She is agreeable to proceeding with the surgical excision of the neck mass with frozen section. She declined and does not desire to proceed with any neck dissection at the time of excision of the neck mass. I advised her that I will ask for frozen section to have some information and to confirm that we have diagnostic material, at the time of surgery. She understands that definitive treatment will depend on the permanent pathology diagnosis.

## 2021-10-21 NOTE — TELEPHONE ENCOUNTER
Yes, of course, I know that, but patient requested the FNA to try to get a diagnosis before the surgery. I will discuss with patient.

## 2021-10-21 NOTE — TELEPHONE ENCOUNTER
Srinivasa Paiz from radiology is calling she says  says the patient does not need the biopsy if they are going to resect the mass they can do the biopsy when the mass is removed.

## 2021-10-25 ENCOUNTER — TELEPHONE (OUTPATIENT)
Dept: ENT CLINIC | Age: 46
End: 2021-10-25

## 2021-10-25 NOTE — TELEPHONE ENCOUNTER
Called patient to inform her that her surgery will be at 9:45 and needs to be at the surgery center at 8:15 for surgery on 11/9/2021. Patient asked about taking medication she was prescribed at Urgent care. She was concerned about taking it with the procedure coming up. Patient was prescribed Azithromycin and a steroid for her sore throat. Please call patient back and let her know if it is okay for her to take this medication before surgery.

## 2021-10-25 NOTE — TELEPHONE ENCOUNTER
Call.  Left message on patient's voicemail that she may go ahead and take the azithromycin but to hold off on the steroid for now. I will try to call her again tomorrow to discuss the steroid.

## 2021-11-04 ENCOUNTER — OFFICE VISIT (OUTPATIENT)
Dept: INTERNAL MEDICINE CLINIC | Age: 46
End: 2021-11-04
Payer: COMMERCIAL

## 2021-11-04 VITALS
WEIGHT: 144 LBS | SYSTOLIC BLOOD PRESSURE: 128 MMHG | HEIGHT: 62 IN | DIASTOLIC BLOOD PRESSURE: 90 MMHG | BODY MASS INDEX: 26.5 KG/M2 | HEART RATE: 76 BPM

## 2021-11-04 DIAGNOSIS — R22.1 MASS OF RIGHT SIDE OF NECK: ICD-10-CM

## 2021-11-04 DIAGNOSIS — Z01.818 PRE-OPERATIVE CLEARANCE: Primary | ICD-10-CM

## 2021-11-04 DIAGNOSIS — R03.0 ELEVATED BP WITHOUT DIAGNOSIS OF HYPERTENSION: ICD-10-CM

## 2021-11-04 PROCEDURE — 99214 OFFICE O/P EST MOD 30 MIN: CPT | Performed by: FAMILY MEDICINE

## 2021-11-04 NOTE — PATIENT INSTRUCTIONS
Mon - Fri 7:30 AM to 12:30 PM  [facility]. [facility]  (near [facility] and off [address] [facility] ---This is right across the street.

## 2021-11-04 NOTE — PROGRESS NOTES
Chief Complaint   Patient presents with    Pre-op Exam     November 9th at [facility], lump removed from neck, Dr. Nader Tejada. Preoperative examination  Jarvis Norman  39 y.o. Evelin Arceo female  1975    This patient presents today at the request of the surgeon for a preoperative consultation. Surgeon:  Sophie Rodgers MD   Indication for surgery:  Right neck mass. Scheduled procedure:  Excision of right neck mass or lymph node and frozen section. Date of surgery:  11/9/21 at [facility]. Previous anesthesia complications:  No prior surgery or General anesthesia  Family history of thrombophilia or hemophilia:  No   Recent chest pain or SOB:   No  Recent infection or exposure to communicable disease: Yes;  Resolving upper respiratory infection  --started around or before 10/24/21  Chronic steroid use:  No  Home Infestations:  Bed Bugs, Lice:  No  MRSA history: No  + COVID vaccinated. Patient Active Problem List   Diagnosis    Mitral valve prolapse    Adjustment disorder with mixed anxiety and depressed mood    Elevated BP without diagnosis of hypertension    ARA (generalized anxiety disorder)    Migraine without status migrainosus, not intractable    Numbness    Daytime sleepiness    Allergic rhinitis    Obstructive sleep apnea       Social History     Tobacco Use    Smoking status: Never Smoker    Smokeless tobacco: Never Used   Vaping Use    Vaping Use: Never used   Substance Use Topics    Alcohol use: Yes     Alcohol/week: 1.0 - 5.0 standard drinks     Types: 1 - 5 Standard drinks or equivalent per week     Comment: lately 1 per week    Drug use: No       Allergies   Allergen Reactions    Levofloxacin Nausea Only       Outpatient Medications Marked as Taking for the 11/4/21 encounter (Office Visit) with Aydee Quintana MD   Medication Sig Dispense Refill    zaleplon (SONATA) 5 MG capsule Take 1-2 capsules by mouth nightly as needed (insomnia) for up to 30 days.  30 capsule 0    montelukast (SINGULAIR) 10 MG tablet TAKE 1 TABLET BY MOUTH DAILY DURING ALLERGY SEASON 90 tablet 3    bisoprolol-hydroCHLOROthiazide (ZIAC) 2.5-6.25 MG per tablet TAKE 1 TABLET BY MOUTH EVERY DAY 90 tablet 3    sertraline (ZOLOFT) 100 MG tablet TAKE 1 TABLET BY MOUTH EVERY DAY 90 tablet 3    loratadine (CLARITIN) 10 MG tablet Take 1 tablet by mouth daily During allergy season 90 tablet 1    cetirizine-psuedoephedrine ([employer] ALLERGY RELIEF-D) 5-120 MG per extended release tablet TAKE 1 TABLET BY MOUTH 2 TIMES DAILY AS NEEDED FOR ALLERGIES OR RHINITIS 30 tablet 5    LORYNA 3-0.02 MG per tablet Take 1 tablet by mouth daily         Past Medical History:   Diagnosis Date    Labile hypertension     appears to be stress related vs white coat    Mitral valve prolapse        History reviewed. No pertinent surgical history. Family History   Problem Relation Age of Onset    Asthma Mother     Rheum Arthritis Mother     Osteoporosis Mother     Depression Mother     High Blood Pressure Father     Other Father         IBS vs IBD    Thyroid Disease Sister     Other Brother         IBS    Heart Disease Paternal Grandfather     Asthma Son         outgrown    Anxiety Disorder Son         or other disorder;  behavorial issues.  Anesth Problems Neg Hx     Clotting Disorder Neg Hx     Bleeding Prob Neg Hx          Review of Systems - Full ROS negative except for the following positives:   Some nasal and sinus congestion. Objective:   Blood pressure (!) 128/90, pulse 76, height 5' 2\" (1.575 m), weight 144 lb (65.3 kg), last menstrual period 10/02/2021, not currently breastfeeding. Body mass index is 26.34 kg/m². Constitutional: Oriented to person, place, and time. Appears well-developed and well-nourished in no acute distress. HEENT: Head normocephalic. Ears clear. Nose clear. Mouth/Throat: Oropharynx is clear and moist.   Neck: Neck supple. Trachea in midline. No thyromegaly.   Tender large mass right neck  Cardiovascular: Normal rate, regular rhythm. Normal heart sounds. Intact distal pulses. Pulmonary/Chest: Effort normal.  Breath sounds normal and clear. Abdominal: Soft. Bowel sounds are normal.  Non-tender. No hepatomegaly or bruit. Musculoskeletal: normal gait. No soft tissue swelling, erythema or warm joints. Neurological: CN II thru XII grossly intact. Normal tone and strength. Skin: Skin is warm and dry. No rash. No pallor or jaundice. Psychiatric: Normal affect and cognition. EKG Interpretation:  NA    ASSESSMENT and Plan:  1. Pre-operative clearance  Cleared for surgery. Low risk for complications. - COVID-19; Future    2. Mass of right side of neck  For surgical removal and pathology. 3. Elevated BP without diagnosis of hypertension  Anxiety tends to cause elevations. 1. Preoperative workup as follows: none    2. Change in medication regimen before surgery: none    3. Prophylaxis for cardiac events with perioperative beta-blockers: Not indicated     4. Deep vein thrombosis prophylaxis: regimen to be chosen by surgical team       No contraindications to planned surgery.        Signature:      Rupinder Roldan MD  11/4/2021

## 2021-11-05 ENCOUNTER — TELEPHONE (OUTPATIENT)
Dept: ENT CLINIC | Age: 46
End: 2021-11-05

## 2021-11-05 DIAGNOSIS — Z01.818 PRE-OPERATIVE CLEARANCE: ICD-10-CM

## 2021-11-05 NOTE — PROGRESS NOTES
Name_______________________________________Printed:____________________  Date and time of surgery_11/9/2021   _0945______________________Arrival Time:__0815_____masc_________   1. The instructions given regarding when and if a patient needs to stop oral intake prior to surgery varies. Follow the specific instructions you were given                  _x__Nothing to eat or to drink after Midnight the night before.                   ____Carbo loading or ERAS instructions will be given to select patients-if you have been given those instructions -please do the following                           The evening before your surgery after dinner before midnight drink 40 ounces of gatorade. If you are diabetic use sugar free. The morning of surgery drink 40 ounces of water. This needs to be finished 3 hours prior to your surgery start time. 2. Take the following pills with a small sip of water on the morning of surgery_none__________________________________________________                  Do not take blood pressure medications ending in pril or sartan the omayra prior to surgery or the morning of surgery_   3. Aspirin, Ibuprofen, Advil, Naproxen, Vitamin E and other Anti-inflammatory products and supplements should be stopped for 5 -7days before surgery or as directed by your physician. 4. Check with your Doctor regarding stopping Plavix, Coumadin,Eliquis, Lovenox,Effient,Pradaxa,Xarelto, Fragmin or other blood thinners and follow their instructions. 5. Do not smoke, and do not drink any alcoholic beverages 24 hours prior to surgery. This includes NA Beer. Refrain from the usage of any recreational drugs. 6. You may brush your teeth and gargle the morning of surgery. DO NOT SWALLOW WATER   7. You MUST make arrangements for a responsible adult to stay on site while you are here and take you home after your surgery. You will not be allowed to leave alone or drive yourself home.   It is strongly suggested someone stay with you the first 24 hrs. Your surgery will be cancelled if you do not have a ride home. 8. A parent/legal guardian must accompany a child scheduled for surgery and plan to stay at the hospital until the child is discharged. Please do not bring other children with you. 9. Please wear simple, loose fitting clothing to the hospital.  [place] not bring valuables (money, credit cards, checkbooks, etc.) Do not wear any makeup (including no eye makeup) or nail polish on your fingers or toes. 10. DO NOT wear any jewelry or piercings on day of surgery. All body piercing jewelry must be removed. 11. If you have ___dentures, they will be removed before going to the OR; we will provide you a container. If you wear ___contact lenses or ___glasses, they will be removed; please bring a case for them. 12. Please see your family doctor/pediatrician for a history & physical and/or concerning medications. Bring any test results/reports from your physician's office. PCP__________________Phone___________H&P Appt. Date________             13 If you  have a Living Will and Durable Power of  for Healthcare, please bring in a copy. 15. Notify your Surgeon if you develop any illness between now and surgery  time, cough, cold, fever, sore throat, nausea, vomiting, etc.  Please notify your surgeon if you experience dizziness, shortness of breath or blurred vision between now & the time of your surgery             15. DO NOT shave your operative site 96 hours prior to surgery. For face & neck surgery, men may use an electric razor 48 hours prior to surgery. 16. Shower the night before or morning of surgery using an antibacterial soap or as you have been instructed. 17. To provide excellent care visitors will be limited to one in the room at any given time. 18.  Please bring picture ID and insurance card.              19.  Visit our web site for additional information:  [email or web address]              20.During flu season no children under the age of 15 are permitted in the hospital for the safety of all patients. 21. If you take a long acting insulin in the evening only  take half of your usual  dose the night  before your procedure              22. If you use a c-pap please bring DOS if staying overnight,             23.For your [facility] has a pharmacy on site to fill your prescriptions. 24. If you use oxygen and have a portable tank please bring it  with you the DOS             25. Bring a complete list of all your medications with name and dose include any supplements. 26. Other__________________________________________   *Please call pre admission testing if you any further questions   Adam Bobo   [address]    [place] [id number] [place]  [phone]   [facility] [address]   [phone]           COVID TESTING    _x__ Covid test to be done 3-5 days prior to scheduled surgery -patient aware they are REQUIRED to bring a copy of the negative result DOS-if they receive a positive result to notify their surgeon         If known - indicate where patient is getting covid test done __11/5/2021   [facility]_________________________    ___ [facility] - DOS    ___ Other___________________________________      Jessie Ana POLICY(subject to change)    There is a one visitor policy at [facility] for all surgeries and endoscopies. Whether the visitor can stay or will be asked to wait in the car will depend on the current policy and if social distancing can be maintained. The policy is subject to change at any time. Please make sure the visitor has a cell phone that is on,charged and able to accept calls, as this may be the way that the staff communicates with them. Pain management is NO VISITOR policyThe patients ride is expected to remain in the car with a cell phone for communication. If the ride is leaving the hospital grounds please make sure they are back in time for pickup. Have the patient inform the staff on arrival what their rides plans are while the patient is in the facility. At the MAIN there is one visitor allowed. Please note that the visitor policy is subject to change. All above information reviewed with patient in person or by phone. Patient verbalizes understanding. All questions and concerns addressed.                                                                                                  Patient/Rep_patient___________________                                                                                                                                    PRE OP INSTRUCTIONS

## 2021-11-06 LAB
CLINICAL REPORT: NORMAL
COMMENT: NORMAL
CYTOLOGY REVIEW, GYN: NORMAL
DIAGNOSIS ICD CODE: NORMAL
HB: CYTOTECHNOLT: NORMAL
Lab: NORMAL
MICROSCOPIC OBSERVATION: NORMAL
SARS-COV-2: NOT DETECTED
SPECIMEN ADEQUACY:: NORMAL

## 2021-11-09 ENCOUNTER — HOSPITAL ENCOUNTER (OUTPATIENT)
Age: 46
Setting detail: OUTPATIENT SURGERY
Discharge: HOME | End: 2021-11-09
Attending: OTOLARYNGOLOGY | Admitting: OTOLARYNGOLOGY
Payer: COMMERCIAL

## 2021-11-09 ENCOUNTER — ANESTHESIA EVENT (OUTPATIENT)
Dept: OPERATING ROOM | Age: 46
End: 2021-11-09
Payer: COMMERCIAL

## 2021-11-09 ENCOUNTER — ANESTHESIA (OUTPATIENT)
Dept: OPERATING ROOM | Age: 46
End: 2021-11-09
Payer: COMMERCIAL

## 2021-11-09 VITALS
TEMPERATURE: 99.1 F | RESPIRATION RATE: 3 BRPM | DIASTOLIC BLOOD PRESSURE: 70 MMHG | SYSTOLIC BLOOD PRESSURE: 132 MMHG | OXYGEN SATURATION: 95 %

## 2021-11-09 VITALS
DIASTOLIC BLOOD PRESSURE: 76 MMHG | OXYGEN SATURATION: 97 % | WEIGHT: 143 LBS | HEART RATE: 98 BPM | BODY MASS INDEX: 26.31 KG/M2 | HEIGHT: 62 IN | SYSTOLIC BLOOD PRESSURE: 123 MMHG | TEMPERATURE: 97.2 F | RESPIRATION RATE: 14 BRPM

## 2021-11-09 DIAGNOSIS — R22.1 MASS OF RIGHT SIDE OF NECK: Primary | ICD-10-CM

## 2021-11-09 DIAGNOSIS — G89.18 POSTOPERATIVE PAIN: ICD-10-CM

## 2021-11-09 LAB — HCG(URINE) PREGNANCY TEST: NEGATIVE

## 2021-11-09 PROCEDURE — 87015 SPECIMEN INFECT AGNT CONCNTJ: CPT

## 2021-11-09 PROCEDURE — 87205 SMEAR GRAM STAIN: CPT

## 2021-11-09 PROCEDURE — 3600000004 HC SURGERY LEVEL 4 BASE: Performed by: OTOLARYNGOLOGY

## 2021-11-09 PROCEDURE — 87075 CULTR BACTERIA EXCEPT BLOOD: CPT

## 2021-11-09 PROCEDURE — 38542 EXPLORE DEEP NODE(S) NECK: CPT | Performed by: OTOLARYNGOLOGY

## 2021-11-09 PROCEDURE — 3700000001 HC ADD 15 MINUTES (ANESTHESIA): Performed by: OTOLARYNGOLOGY

## 2021-11-09 PROCEDURE — 87102 FUNGUS ISOLATION CULTURE: CPT

## 2021-11-09 PROCEDURE — 88331 PATH CONSLTJ SURG 1 BLK 1SPC: CPT

## 2021-11-09 PROCEDURE — 3600000012 HC SURGERY LEVEL 2 ADDTL 15MIN: Performed by: OTOLARYNGOLOGY

## 2021-11-09 PROCEDURE — 2580000003 HC RX 258: Performed by: OTOLARYNGOLOGY

## 2021-11-09 PROCEDURE — 88312 SPECIAL STAINS GROUP 1: CPT

## 2021-11-09 PROCEDURE — 6360000002 HC RX W HCPCS: Performed by: ANESTHESIOLOGY

## 2021-11-09 PROCEDURE — 6360000002 HC RX W HCPCS: Performed by: NURSE ANESTHETIST, CERTIFIED REGISTERED

## 2021-11-09 PROCEDURE — 2720000010 HC SURG SUPPLY STERILE: Performed by: OTOLARYNGOLOGY

## 2021-11-09 PROCEDURE — 7100000010 HC PHASE II RECOVERY - FIRST 15 MIN: Performed by: OTOLARYNGOLOGY

## 2021-11-09 PROCEDURE — 87206 SMEAR FLUORESCENT/ACID STAI: CPT

## 2021-11-09 PROCEDURE — 3600000014 HC SURGERY LEVEL 4 ADDTL 15MIN: Performed by: OTOLARYNGOLOGY

## 2021-11-09 PROCEDURE — 7100000000 HC PACU RECOVERY - FIRST 15 MIN: Performed by: OTOLARYNGOLOGY

## 2021-11-09 PROCEDURE — 2500000003 HC RX 250 WO HCPCS: Performed by: NURSE ANESTHETIST, CERTIFIED REGISTERED

## 2021-11-09 PROCEDURE — 7100000001 HC PACU RECOVERY - ADDTL 15 MIN: Performed by: OTOLARYNGOLOGY

## 2021-11-09 PROCEDURE — 2709999900 HC NON-CHARGEABLE SUPPLY: Performed by: OTOLARYNGOLOGY

## 2021-11-09 PROCEDURE — 6360000002 HC RX W HCPCS: Performed by: OTOLARYNGOLOGY

## 2021-11-09 PROCEDURE — 87070 CULTURE OTHR SPECIMN AEROBIC: CPT

## 2021-11-09 PROCEDURE — 7100000011 HC PHASE II RECOVERY - ADDTL 15 MIN: Performed by: OTOLARYNGOLOGY

## 2021-11-09 PROCEDURE — 6370000000 HC RX 637 (ALT 250 FOR IP): Performed by: ANESTHESIOLOGY

## 2021-11-09 PROCEDURE — 84703 CHORIONIC GONADOTROPIN ASSAY: CPT

## 2021-11-09 PROCEDURE — 88305 TISSUE EXAM BY PATHOLOGIST: CPT

## 2021-11-09 PROCEDURE — 3700000000 HC ANESTHESIA ATTENDED CARE: Performed by: OTOLARYNGOLOGY

## 2021-11-09 PROCEDURE — 3600000002 HC SURGERY LEVEL 2 BASE: Performed by: OTOLARYNGOLOGY

## 2021-11-09 PROCEDURE — 88307 TISSUE EXAM BY PATHOLOGIST: CPT

## 2021-11-09 PROCEDURE — 2500000003 HC RX 250 WO HCPCS: Performed by: OTOLARYNGOLOGY

## 2021-11-09 PROCEDURE — 88342 IMHCHEM/IMCYTCHM 1ST ANTB: CPT

## 2021-11-09 PROCEDURE — 87116 MYCOBACTERIA CULTURE: CPT

## 2021-11-09 RX ORDER — FENTANYL CITRATE 50 UG/ML
INJECTION, SOLUTION INTRAMUSCULAR; INTRAVENOUS PRN
Status: DISCONTINUED | OUTPATIENT
Start: 2021-11-09 | End: 2021-11-09 | Stop reason: SDUPTHER

## 2021-11-09 RX ORDER — LABETALOL HYDROCHLORIDE 5 MG/ML
5 INJECTION, SOLUTION INTRAVENOUS EVERY 10 MIN PRN
Status: DISCONTINUED | OUTPATIENT
Start: 2021-11-09 | End: 2021-11-09 | Stop reason: HOSPADM

## 2021-11-09 RX ORDER — APREPITANT 40 MG/1
40 CAPSULE ORAL ONCE
Status: COMPLETED | OUTPATIENT
Start: 2021-11-09 | End: 2021-11-09

## 2021-11-09 RX ORDER — SUCCINYLCHOLINE/SOD CL,ISO/PF 200MG/10ML
SYRINGE (ML) INTRAVENOUS PRN
Status: DISCONTINUED | OUTPATIENT
Start: 2021-11-09 | End: 2021-11-09 | Stop reason: SDUPTHER

## 2021-11-09 RX ORDER — OXYCODONE HYDROCHLORIDE AND ACETAMINOPHEN 5; 325 MG/1; MG/1
1 TABLET ORAL
Status: COMPLETED | OUTPATIENT
Start: 2021-11-09 | End: 2021-11-09

## 2021-11-09 RX ORDER — DEXAMETHASONE SODIUM PHOSPHATE 4 MG/ML
INJECTION, SOLUTION INTRA-ARTICULAR; INTRALESIONAL; INTRAMUSCULAR; INTRAVENOUS; SOFT TISSUE PRN
Status: DISCONTINUED | OUTPATIENT
Start: 2021-11-09 | End: 2021-11-09 | Stop reason: SDUPTHER

## 2021-11-09 RX ORDER — PHENYLEPHRINE HCL IN 0.9% NACL 1 MG/10 ML
SYRINGE (ML) INTRAVENOUS PRN
Status: DISCONTINUED | OUTPATIENT
Start: 2021-11-09 | End: 2021-11-09 | Stop reason: SDUPTHER

## 2021-11-09 RX ORDER — LIDOCAINE HYDROCHLORIDE 20 MG/ML
INJECTION, SOLUTION EPIDURAL; INFILTRATION; INTRACAUDAL; PERINEURAL PRN
Status: DISCONTINUED | OUTPATIENT
Start: 2021-11-09 | End: 2021-11-09 | Stop reason: SDUPTHER

## 2021-11-09 RX ORDER — MEPERIDINE HYDROCHLORIDE 25 MG/ML
12.5 INJECTION INTRAMUSCULAR; INTRAVENOUS; SUBCUTANEOUS EVERY 5 MIN PRN
Status: DISCONTINUED | OUTPATIENT
Start: 2021-11-09 | End: 2021-11-09 | Stop reason: HOSPADM

## 2021-11-09 RX ORDER — PROMETHAZINE HYDROCHLORIDE 25 MG/1
25 TABLET ORAL EVERY 6 HOURS PRN
Qty: 40 TABLET | Refills: 0 | Status: SHIPPED | OUTPATIENT
Start: 2021-11-09 | End: 2021-12-08

## 2021-11-09 RX ORDER — HYDRALAZINE HYDROCHLORIDE 20 MG/ML
5 INJECTION INTRAMUSCULAR; INTRAVENOUS EVERY 10 MIN PRN
Status: DISCONTINUED | OUTPATIENT
Start: 2021-11-09 | End: 2021-11-09 | Stop reason: HOSPADM

## 2021-11-09 RX ORDER — SODIUM CHLORIDE, SODIUM LACTATE, POTASSIUM CHLORIDE, CALCIUM CHLORIDE 600; 310; 30; 20 MG/100ML; MG/100ML; MG/100ML; MG/100ML
INJECTION, SOLUTION INTRAVENOUS CONTINUOUS
Status: CANCELLED | OUTPATIENT
Start: 2021-11-09

## 2021-11-09 RX ORDER — SODIUM CHLORIDE 0.9 % (FLUSH) 0.9 %
10 SYRINGE (ML) INJECTION PRN
Status: CANCELLED | OUTPATIENT
Start: 2021-11-09

## 2021-11-09 RX ORDER — METHYLENE BLUE 10 MG/ML
INJECTION INTRAVENOUS
Status: COMPLETED | OUTPATIENT
Start: 2021-11-09 | End: 2021-11-09

## 2021-11-09 RX ORDER — KETOROLAC TROMETHAMINE 30 MG/ML
INJECTION, SOLUTION INTRAMUSCULAR; INTRAVENOUS PRN
Status: DISCONTINUED | OUTPATIENT
Start: 2021-11-09 | End: 2021-11-09 | Stop reason: SDUPTHER

## 2021-11-09 RX ORDER — SODIUM CHLORIDE 0.9 % (FLUSH) 0.9 %
10 SYRINGE (ML) INJECTION EVERY 12 HOURS SCHEDULED
Status: CANCELLED | OUTPATIENT
Start: 2021-11-09

## 2021-11-09 RX ORDER — EPHEDRINE SULFATE/0.9% NACL/PF 50 MG/5 ML
SYRINGE (ML) INTRAVENOUS PRN
Status: DISCONTINUED | OUTPATIENT
Start: 2021-11-09 | End: 2021-11-09 | Stop reason: SDUPTHER

## 2021-11-09 RX ORDER — SODIUM CHLORIDE, SODIUM LACTATE, POTASSIUM CHLORIDE, CALCIUM CHLORIDE 600; 310; 30; 20 MG/100ML; MG/100ML; MG/100ML; MG/100ML
INJECTION, SOLUTION INTRAVENOUS CONTINUOUS
Status: DISCONTINUED | OUTPATIENT
Start: 2021-11-09 | End: 2021-11-09 | Stop reason: HOSPADM

## 2021-11-09 RX ORDER — LIDOCAINE HYDROCHLORIDE AND EPINEPHRINE 10; 10 MG/ML; UG/ML
INJECTION, SOLUTION INFILTRATION; PERINEURAL
Status: COMPLETED | OUTPATIENT
Start: 2021-11-09 | End: 2021-11-09

## 2021-11-09 RX ORDER — SODIUM CHLORIDE 9 MG/ML
25 INJECTION, SOLUTION INTRAVENOUS PRN
Status: CANCELLED | OUTPATIENT
Start: 2021-11-09

## 2021-11-09 RX ORDER — MAGNESIUM SULFATE HEPTAHYDRATE 500 MG/ML
INJECTION, SOLUTION INTRAMUSCULAR; INTRAVENOUS PRN
Status: DISCONTINUED | OUTPATIENT
Start: 2021-11-09 | End: 2021-11-09 | Stop reason: SDUPTHER

## 2021-11-09 RX ORDER — CEPHALEXIN 250 MG/1
250 CAPSULE ORAL 4 TIMES DAILY
Qty: 28 CAPSULE | Refills: 0 | Status: SHIPPED | OUTPATIENT
Start: 2021-11-09 | End: 2021-11-16

## 2021-11-09 RX ORDER — ONDANSETRON 2 MG/ML
4 INJECTION INTRAMUSCULAR; INTRAVENOUS
Status: DISCONTINUED | OUTPATIENT
Start: 2021-11-09 | End: 2021-11-09 | Stop reason: HOSPADM

## 2021-11-09 RX ORDER — LIDOCAINE HYDROCHLORIDE 10 MG/ML
1 INJECTION, SOLUTION EPIDURAL; INFILTRATION; INTRACAUDAL; PERINEURAL
Status: CANCELLED | OUTPATIENT
Start: 2021-11-09 | End: 2021-11-09

## 2021-11-09 RX ORDER — HYDROCODONE BITARTRATE AND ACETAMINOPHEN 5; 325 MG/1; MG/1
1 TABLET ORAL EVERY 4 HOURS PRN
Qty: 36 TABLET | Refills: 0 | Status: SHIPPED | OUTPATIENT
Start: 2021-11-09 | End: 2021-11-15

## 2021-11-09 RX ORDER — PROPOFOL 10 MG/ML
INJECTION, EMULSION INTRAVENOUS PRN
Status: DISCONTINUED | OUTPATIENT
Start: 2021-11-09 | End: 2021-11-09 | Stop reason: SDUPTHER

## 2021-11-09 RX ORDER — HYDROMORPHONE HCL 110MG/55ML
0.5 PATIENT CONTROLLED ANALGESIA SYRINGE INTRAVENOUS EVERY 5 MIN PRN
Status: DISCONTINUED | OUTPATIENT
Start: 2021-11-09 | End: 2021-11-09 | Stop reason: HOSPADM

## 2021-11-09 RX ORDER — ONDANSETRON 2 MG/ML
INJECTION INTRAMUSCULAR; INTRAVENOUS PRN
Status: DISCONTINUED | OUTPATIENT
Start: 2021-11-09 | End: 2021-11-09 | Stop reason: SDUPTHER

## 2021-11-09 RX ORDER — GLYCOPYRROLATE 1 MG/5 ML
SYRINGE (ML) INTRAVENOUS PRN
Status: DISCONTINUED | OUTPATIENT
Start: 2021-11-09 | End: 2021-11-09 | Stop reason: SDUPTHER

## 2021-11-09 RX ORDER — MIDAZOLAM HYDROCHLORIDE 1 MG/ML
INJECTION INTRAMUSCULAR; INTRAVENOUS PRN
Status: DISCONTINUED | OUTPATIENT
Start: 2021-11-09 | End: 2021-11-09 | Stop reason: SDUPTHER

## 2021-11-09 RX ADMIN — Medication 10 MG: at 13:28

## 2021-11-09 RX ADMIN — FENTANYL CITRATE 25 MCG: 50 INJECTION, SOLUTION INTRAMUSCULAR; INTRAVENOUS at 12:45

## 2021-11-09 RX ADMIN — DEXAMETHASONE SODIUM PHOSPHATE 10 MG: 4 INJECTION, SOLUTION INTRAMUSCULAR; INTRAVENOUS at 10:52

## 2021-11-09 RX ADMIN — Medication 10 MG: at 11:27

## 2021-11-09 RX ADMIN — FENTANYL CITRATE 25 MCG: 50 INJECTION, SOLUTION INTRAMUSCULAR; INTRAVENOUS at 12:54

## 2021-11-09 RX ADMIN — ONDANSETRON 4 MG: 2 INJECTION INTRAMUSCULAR; INTRAVENOUS at 13:20

## 2021-11-09 RX ADMIN — SODIUM CHLORIDE, POTASSIUM CHLORIDE, SODIUM LACTATE AND CALCIUM CHLORIDE: 600; 310; 30; 20 INJECTION, SOLUTION INTRAVENOUS at 12:00

## 2021-11-09 RX ADMIN — Medication 100 MG: at 10:48

## 2021-11-09 RX ADMIN — CEFAZOLIN 2000 MG: 10 INJECTION, POWDER, FOR SOLUTION INTRAVENOUS at 10:39

## 2021-11-09 RX ADMIN — MIDAZOLAM 2 MG: 1 INJECTION INTRAMUSCULAR; INTRAVENOUS at 10:43

## 2021-11-09 RX ADMIN — OXYCODONE HYDROCHLORIDE AND ACETAMINOPHEN 1 TABLET: 5; 325 TABLET ORAL at 14:49

## 2021-11-09 RX ADMIN — Medication 100 MCG: at 11:13

## 2021-11-09 RX ADMIN — HYDROMORPHONE HYDROCHLORIDE 0.5 MG: 2 INJECTION, SOLUTION INTRAMUSCULAR; INTRAVENOUS; SUBCUTANEOUS at 14:28

## 2021-11-09 RX ADMIN — Medication 100 MCG: at 11:19

## 2021-11-09 RX ADMIN — MAGNESIUM SULFATE HEPTAHYDRATE 1 G: 500 INJECTION, SOLUTION INTRAMUSCULAR; INTRAVENOUS at 10:53

## 2021-11-09 RX ADMIN — APREPITANT 40 MG: 40 CAPSULE ORAL at 08:33

## 2021-11-09 RX ADMIN — HYDROMORPHONE HYDROCHLORIDE 0.5 MG: 2 INJECTION, SOLUTION INTRAMUSCULAR; INTRAVENOUS; SUBCUTANEOUS at 14:15

## 2021-11-09 RX ADMIN — FENTANYL CITRATE 25 MCG: 50 INJECTION, SOLUTION INTRAMUSCULAR; INTRAVENOUS at 13:43

## 2021-11-09 RX ADMIN — Medication 10 MG: at 11:38

## 2021-11-09 RX ADMIN — KETOROLAC TROMETHAMINE 30 MG: 60 INJECTION, SOLUTION INTRAMUSCULAR at 13:20

## 2021-11-09 RX ADMIN — LIDOCAINE HYDROCHLORIDE 50 MG: 20 INJECTION, SOLUTION EPIDURAL; INFILTRATION; INTRACAUDAL; PERINEURAL at 10:47

## 2021-11-09 RX ADMIN — Medication 0.2 MG: at 10:43

## 2021-11-09 RX ADMIN — SODIUM CHLORIDE, POTASSIUM CHLORIDE, SODIUM LACTATE AND CALCIUM CHLORIDE: 600; 310; 30; 20 INJECTION, SOLUTION INTRAVENOUS at 11:35

## 2021-11-09 RX ADMIN — FENTANYL CITRATE 25 MCG: 50 INJECTION, SOLUTION INTRAMUSCULAR; INTRAVENOUS at 12:35

## 2021-11-09 RX ADMIN — SODIUM CHLORIDE, POTASSIUM CHLORIDE, SODIUM LACTATE AND CALCIUM CHLORIDE: 600; 310; 30; 20 INJECTION, SOLUTION INTRAVENOUS at 08:40

## 2021-11-09 RX ADMIN — Medication 10 MG: at 11:46

## 2021-11-09 RX ADMIN — PROPOFOL 120 MG: 10 INJECTION, EMULSION INTRAVENOUS at 10:47

## 2021-11-09 RX ADMIN — Medication 100 MCG: at 11:32

## 2021-11-09 RX ADMIN — Medication 10 MG: at 11:23

## 2021-11-09 ASSESSMENT — PULMONARY FUNCTION TESTS
PIF_VALUE: 18
PIF_VALUE: 15
PIF_VALUE: 16
PIF_VALUE: 18
PIF_VALUE: 16
PIF_VALUE: 18
PIF_VALUE: 19
PIF_VALUE: 18
PIF_VALUE: 16
PIF_VALUE: 18
PIF_VALUE: 19
PIF_VALUE: 19
PIF_VALUE: 0
PIF_VALUE: 18
PIF_VALUE: 16
PIF_VALUE: 16
PIF_VALUE: 18
PIF_VALUE: 18
PIF_VALUE: 19
PIF_VALUE: 18
PIF_VALUE: 18
PIF_VALUE: 16
PIF_VALUE: 18
PIF_VALUE: 19
PIF_VALUE: 18
PIF_VALUE: 19
PIF_VALUE: 16
PIF_VALUE: 1
PIF_VALUE: 19
PIF_VALUE: 18
PIF_VALUE: 16
PIF_VALUE: 18
PIF_VALUE: 18
PIF_VALUE: 16
PIF_VALUE: 18
PIF_VALUE: 19
PIF_VALUE: 16
PIF_VALUE: 19
PIF_VALUE: 14
PIF_VALUE: 18
PIF_VALUE: 19
PIF_VALUE: 16
PIF_VALUE: 17
PIF_VALUE: 18
PIF_VALUE: 16
PIF_VALUE: 2
PIF_VALUE: 16
PIF_VALUE: 18
PIF_VALUE: 9
PIF_VALUE: 18
PIF_VALUE: 16
PIF_VALUE: 18
PIF_VALUE: 18
PIF_VALUE: 5
PIF_VALUE: 16
PIF_VALUE: 18
PIF_VALUE: 17
PIF_VALUE: 18
PIF_VALUE: 19
PIF_VALUE: 18
PIF_VALUE: 18
PIF_VALUE: 16
PIF_VALUE: 18
PIF_VALUE: 16
PIF_VALUE: 16
PIF_VALUE: 18
PIF_VALUE: 16
PIF_VALUE: 18
PIF_VALUE: 18
PIF_VALUE: 16
PIF_VALUE: 18
PIF_VALUE: 16
PIF_VALUE: 17
PIF_VALUE: 18
PIF_VALUE: 18
PIF_VALUE: 16
PIF_VALUE: 18
PIF_VALUE: 17
PIF_VALUE: 18
PIF_VALUE: 3
PIF_VALUE: 18
PIF_VALUE: 16
PIF_VALUE: 18
PIF_VALUE: 18
PIF_VALUE: 7
PIF_VALUE: 1
PIF_VALUE: 18
PIF_VALUE: 19
PIF_VALUE: 18
PIF_VALUE: 19
PIF_VALUE: 18
PIF_VALUE: 16
PIF_VALUE: 18
PIF_VALUE: 19
PIF_VALUE: 18
PIF_VALUE: 6
PIF_VALUE: 18
PIF_VALUE: 1
PIF_VALUE: 18
PIF_VALUE: 18
PIF_VALUE: 16
PIF_VALUE: 18
PIF_VALUE: 1
PIF_VALUE: 0
PIF_VALUE: 18
PIF_VALUE: 18
PIF_VALUE: 19
PIF_VALUE: 18
PIF_VALUE: 18
PIF_VALUE: 16
PIF_VALUE: 18
PIF_VALUE: 1
PIF_VALUE: 19
PIF_VALUE: 16
PIF_VALUE: 18
PIF_VALUE: 18
PIF_VALUE: 14
PIF_VALUE: 18
PIF_VALUE: 16
PIF_VALUE: 15
PIF_VALUE: 18
PIF_VALUE: 19
PIF_VALUE: 19
PIF_VALUE: 16
PIF_VALUE: 18
PIF_VALUE: 18
PIF_VALUE: 16
PIF_VALUE: 18
PIF_VALUE: 16
PIF_VALUE: 18
PIF_VALUE: 18
PIF_VALUE: 19
PIF_VALUE: 18
PIF_VALUE: 16
PIF_VALUE: 19
PIF_VALUE: 18
PIF_VALUE: 18
PIF_VALUE: 16
PIF_VALUE: 17
PIF_VALUE: 18
PIF_VALUE: 16
PIF_VALUE: 19
PIF_VALUE: 18
PIF_VALUE: 17
PIF_VALUE: 19
PIF_VALUE: 15
PIF_VALUE: 16
PIF_VALUE: 4
PIF_VALUE: 18
PIF_VALUE: 16
PIF_VALUE: 18
PIF_VALUE: 18
PIF_VALUE: 17
PIF_VALUE: 18
PIF_VALUE: 1
PIF_VALUE: 17
PIF_VALUE: 19
PIF_VALUE: 18
PIF_VALUE: 16
PIF_VALUE: 19

## 2021-11-09 ASSESSMENT — PAIN DESCRIPTION - PAIN TYPE
TYPE: SURGICAL PAIN

## 2021-11-09 ASSESSMENT — PAIN DESCRIPTION - LOCATION
LOCATION: NECK

## 2021-11-09 ASSESSMENT — PAIN SCALES - GENERAL
PAINLEVEL_OUTOF10: 4
PAINLEVEL_OUTOF10: 4
PAINLEVEL_OUTOF10: 6
PAINLEVEL_OUTOF10: 7
PAINLEVEL_OUTOF10: 6

## 2021-11-09 ASSESSMENT — PAIN DESCRIPTION - DESCRIPTORS
DESCRIPTORS: ACHING;DISCOMFORT

## 2021-11-09 ASSESSMENT — PAIN DESCRIPTION - ONSET: ONSET: GRADUAL

## 2021-11-09 ASSESSMENT — LIFESTYLE VARIABLES: SMOKING_STATUS: 0

## 2021-11-09 ASSESSMENT — PAIN DESCRIPTION - ORIENTATION
ORIENTATION: ANTERIOR

## 2021-11-09 ASSESSMENT — PAIN - FUNCTIONAL ASSESSMENT: PAIN_FUNCTIONAL_ASSESSMENT: 0-10

## 2021-11-09 ASSESSMENT — PAIN DESCRIPTION - PROGRESSION
CLINICAL_PROGRESSION: GRADUALLY IMPROVING
CLINICAL_PROGRESSION: GRADUALLY IMPROVING

## 2021-11-09 NOTE — PROGRESS NOTES
Discharge instructions review with patient and Noreen Rosangela (). All home medications have been reviewed, pt v/u. Discharge instructions signed. Pt discharged via wheelchair. Pt discharged with 3 Rx and all belongings.  taking stable pt home.

## 2021-11-09 NOTE — OP NOTE
Operative Note      Patient: Zuleika Villarreal  YOB: 1975  MRN: [id number]    Date of Procedure: 11/9/2021    Pre-Op Diagnosis:  MASS OF RIGHT SIDE OF NECK, CERVICAL LYMPHADENOPATHY    Post-Op Diagnosis: Same       Procedure(s):  EXCISION OF RIGHT DEEP JUGULAR CHAIN LYMPH NODES    Surgeon(s):  Omar Garnett MD    Assistant:   First Assistant: Holly Sen    Anesthesia: General    Estimated Blood Loss (mL): less than 100 (one hundred)    Complications: None    Specimens:   ID Type Source Tests Collected by Time Destination   1 : 1.aerobic and anaerobic culture right neck mass  Tissue Tissue CULTURE, TISSUE Omar Garnett MD 11/9/2021 1245    A : A:RIGHT  NECK MASS RULE OUT LYMPHOMA  AREA DECOMPRESSED ON REMOVAL Tissue Tissue SURGICAL PATHOLOGY Omar Garnett MD 11/9/2021 1313    B : B LYMPHNODE FROM PAROTID TAIL AREA ADJACENT TO THE MAIN TUMOR MASS Tissue Tissue SURGICAL PATHOLOGY Omar Garnett MD 11/9/2021 1320    C : C LYMPHN NODE INFERIOR TO THE MAIN TUMOR MASS Tissue Tissue SURGICAL PATHOLOGY Omar Garnett MD 11/9/2021 1322        Implants:  * No implants in log *      Drains: * No LDAs found *    Findings: There was a large 2.5 x 4 cm firm rubbery mass anterior and deep to the SCM muscle and just lateral to and intimate to the internal jugular vein and anterior jugular vein and just lateral to the internal carotid artery and vagus nerve. The mass was intimately and tightly adherent and appeared to be invading the adjacent SCM muscle. A cuff of the adjacent SCM muscle was take in continuity with the mass. All other surrounding tissues appeared to be normal.  There was a cystic component to the mass which was ruptured during dissection and exuded a pale greenish white exudate which was not foul smelling. Cultured for aerobic, anaerobic, and acid fast bacteria, as well as fungus. There was an adjacent lymph node in the tail of parotid area that was excised. There was also a similar node inferior to the mass that was excised. These were sent separately to pathology. The internal jugular vein and vagus nerve appeared to be normal with no surgical violation. There was one branch of the anterior jugular vein which was divided and bled significantly and was eventually ligated with 2-0 silk and and suture ligature of 2-0 silk placed. The other end appeared to enter the tumor mass.         Detailed Description of Procedure: The patient was taken to the operating room and placed in the supine position. Adequate and uncomplicated general anesthesia was induced and maintained by attending anesthetist using an oral endotracheal tube. A timeout was held and the patient was identified and the procedure and site and site of procedure confirmed. The patient was then positioned and prepped and draped in sterile manner. The incision was then planned and marked with a marking pen placing it 2 fingerbreadths below the inferior margin of the mandible and within an existing skin fold and parallel to the skin tension lines. Methylene blue dots were then created with an 18-gauge needle to facilitate accurate tissue reapproximation at the closure of the wound. Incision was then made with a #15 scalpel and carried down through the skin subcutaneous tissues and platysma muscle. Superior and inferior skin platysmal flaps were then elevated to an appropriate extent. These were retracted. Then sharp and blunt dissection continued through deeper layers. Throughout the case, all tissue was stimulated with the Vari-Stim nerve stimulator prior to division to ensure absence of the marginal mandibular branch of the facial nerve and hypoglossal nerve. The anterior border the sternocleidomastoid muscle was identified and dissected. Capsule of the cyst was encountered. Sharp and blunt dissection along the capsule of the cyst  it from surrounding tissues.   At the anterior extent a branch from the anterior jugular vein which appeared to be entering the mass was encountered. It was divided. It was ligated with a 2-0 silk suture followed by a distal 2-0 silk suture ligature. Dissection then continued circumferentially around the mass. At one point, the cystic component of the mass was ruptured and a greenish-white fluid was exuded. This was cultured for aerobic, anaerobic and acid-fast bacteria, and fungus. The rent in the capsule was clamped with a hemostat clamp which succeeded in closing the rent and preventing further evacuation of fluid. Then with sharp and blunt dissection the along the capsule of the mass, it was eventually  from the patient. This was very judiciously performed on the deep surface which was immediately adjacent and superficial to the jugular vein, vagus nerve, and carotid artery. These structures were not violated surgically. The wound was then irrigated with copious sterile saline. Several minimally oozing bleeding points are controlled with the bipolar electrocautery. A drain was not felt necessary. The wound was then approximated in layers with 2-0 chromic inverted buried sutures to approximate the platysma muscle and fascia layer, 4-0 Vicryl to approximate the subcutaneous dermal layer, and a running subcuticular closure of skin edge using 4-0 Monocryl. This was reinforced with Dermabond tissue adhesive. A dry sterile dressing was placed. Patient was then awakened and extubated and taken to the PACU in stable condition having tolerated this procedure well with no intraoperative complications and blood loss less than 100 ml.     Electronically signed by Kevin Vega MD on 11/9/2021 at 2:00 PM

## 2021-11-09 NOTE — PROGRESS NOTES
Pt arrived from OR to PACU bay 2. Reported received from [address] staff. Pt awake and alert. Surgical incisions dressings in place to anterior neck. . Pt on RA, sinus tach, and VSS. Will continue to monitor.

## 2021-11-09 NOTE — ANESTHESIA POSTPROCEDURE EVALUATION
Department of Anesthesiology  Postprocedure Note    Patient: Yenny Spears  MRN: [id number]  YOB: 1975  Date of evaluation: 11/9/2021  Time:  2:07 PM     Procedure Summary     Date: 11/09/21 Room / Location: [address]    Anesthesia Start: 5419 Anesthesia Stop: 9244    Procedure: EXCISION OF RIGHT NECK MASS ; FROZEN SECTIONS (N/A Neck) Diagnosis: (R22.1 MASS OF RIGHT SIDE OF NECK)    Surgeons: Marcela De Guzman MD Responsible Provider: Aleksandr Deluna MD    Anesthesia Type: general ASA Status: 2          Anesthesia Type: general    Gm Phase I: Gm Score: 10    Gm Phase II:      Last vitals: Reviewed and per EMR flowsheets.        Anesthesia Post Evaluation    Patient location during evaluation: PACU  Patient participation: complete - patient participated  Level of consciousness: awake and alert  Airway patency: patent  Nausea & Vomiting: no vomiting and no nausea  Complications: no  Cardiovascular status: hemodynamically stable  Respiratory status: acceptable  Hydration status: stable

## 2021-11-09 NOTE — H&P
Date of Surgery Update:  Veronica Rick was seen, history and physical examination reviewed, and patient examined by me today. There have been no significant clinical changes since the completion of the previous history and physical.    The risk, benefits, and alternatives of the proposed procedure have been explained to the patient (or appropriate guardian) and understanding verbalized. All questions answered. Patient wishes to proceed.     Electronically signed by: Majo Mobley MD,11/9/2021,10:14 AM

## 2021-11-09 NOTE — ANESTHESIA PRE PROCEDURE
Department of Anesthesiology  Preprocedure Note       Name:  Aspen Alves   Age:  39 y.o.  :  1975                                          MRN:  [id number]         Date:  2021      Surgeon: Anthony Carmen):  Crispin Lin MD    Procedure: Procedure(s):  EXCISION OF RIGHT NECK MASS OR LYMPH NODE; FROZEN SECTIONS    Medications prior to admission:   Prior to Admission medications    Medication Sig Start Date End Date Taking?  Authorizing Provider   bisoprolol-hydroCHLOROthiazide (Gregor Fraise) 2.5-6.25 MG per tablet TAKE 1 TABLET BY MOUTH EVERY DAY 20  Yes Hortencia Sierra MD   sertraline (ZOLOFT) 100 MG tablet TAKE 1 TABLET BY MOUTH EVERY DAY 20  Yes Hortencia Sierra MD   LORYNA 3-0.02 MG per tablet Take 1 tablet by mouth daily 3/15/17  Yes Historical Provider, MD   naproxen (NAPROSYN) 500 MG tablet TAKE 1 TABLET BY MOUTH TWICE A DAY WITH FOOD  Patient not taking: Reported on 2021 10/1/21   Historical Provider, MD   montelukast (SINGULAIR) 10 MG tablet TAKE 1 TABLET BY MOUTH DAILY DURING ALLERGY SEASON  Patient not taking: Reported on 2021   Hortencia Sierra MD   loratadine (CLARITIN) 10 MG tablet Take 1 tablet by mouth daily During allergy season  Patient not taking: Reported on 2021   Hortencia Sierra MD   cetirizine-psuedoephedrine ([employer] ALLERGY RELIEF-D) 5-120 MG per extended release tablet TAKE 1 TABLET BY MOUTH 2 TIMES DAILY AS NEEDED FOR ALLERGIES OR RHINITIS  Patient not taking: Reported on 2021   Hortencia Sierra MD       Current medications:    Current Facility-Administered Medications   Medication Dose Route Frequency Provider Last Rate Last Admin    ceFAZolin (ANCEF) 2000 mg in dextrose 5 % 50 mL IVPB  2,000 mg IntraVENous Once Crispin Lin MD        lactated ringers infusion   IntraVENous Continuous Crispin Lin MD        meperidine (DEMEROL) injection 12.5 mg  12.5 mg IntraVENous Q5 Min PRN Dex Figueroa MD       [facility] Last 3 Encounters:   11/04/21 (!) 128/90   10/20/21 (!) 142/97   10/08/21 (!) 152/99       NPO Status:                                                                                 BMI:   Wt Readings from Last 3 Encounters:   11/05/21 140 lb (63.5 kg)   11/04/21 144 lb (65.3 kg)   10/07/21 144 lb (65.3 kg)     Body mass index is 25.61 kg/m². CBC:   Lab Results   Component Value Date    WBC 10.7 10/07/2021    RBC 4.50 10/07/2021    RBC 4.44 10/23/2018    HGB 13.6 10/07/2021    HCT 41.6 10/07/2021    MCV 92.5 10/07/2021    RDW 13.6 10/07/2021     10/07/2021       CMP:   Lab Results   Component Value Date     10/07/2021    K 4.4 10/07/2021     10/07/2021    CO2 21 10/07/2021    BUN 13 10/07/2021    CREATININE 0.6 10/07/2021    GFRAA >60 10/07/2021    AGRATIO 1.6 10/07/2021    LABGLOM >60 10/07/2021    GLUCOSE 67 10/07/2021    PROT 7.4 10/07/2021    CALCIUM 10.1 10/07/2021    BILITOT <0.2 10/07/2021    ALKPHOS 69 10/07/2021    AST 12 10/07/2021    ALT 10 10/07/2021       POC Tests: No results for input(s): POCGLU, POCNA, POCK, POCCL, POCBUN, POCHEMO, POCHCT in the last 72 hours.     Coags: No results found for: PROTIME, INR, APTT    HCG (If Applicable): No results found for: PREGTESTUR, PREGSERUM, HCG, HCGQUANT     ABGs: No results found for: PHART, PO2ART, GNL3BSJ, ERH0FEG, BEART, L5LICGUE     Type & Screen (If Applicable):  No results found for: LABABO, LABRH    Drug/Infectious Status (If Applicable):  No results found for: HIV, HEPCAB    COVID-19 Screening (If Applicable):   Lab Results   Component Value Date    COVID19 Not Detected 11/05/2021           Anesthesia Evaluation  Patient summary reviewed and Nursing notes reviewed no history of anesthetic complications:   Airway: Mallampati: II  TM distance: >3 FB   Neck ROM: full  Mouth opening: > = 3 FB Dental: normal exam         Pulmonary:normal exam  breath sounds clear to auscultation  (+) recent URI (s/p abx and steroid, mild cough, lungs/oropharynx clear):  sleep apnea (mild per pt, no device req):      (-) COPD, asthma and not a current smoker                           Cardiovascular:    (+) hypertension:,     (-) past MI, CAD, CABG/stent, dysrhythmias,  angina and  CHF      Rhythm: regular  Rate: normal           Beta Blocker:  Dose within 24 Hrs         Neuro/Psych:   (+) headaches: migraine headaches, depression/anxiety    (-) seizures, TIA and CVA           GI/Hepatic/Renal: Neg GI/Hepatic/Renal ROS       (-) GERD, liver disease and no renal disease       Endo/Other: Negative Endo/Other ROS       (-) diabetes mellitus, hypothyroidism, hyperthyroidism               Abdominal:             Vascular: Other Findings:             Anesthesia Plan      general     ASA 2       Induction: intravenous. MIPS: Postoperative opioids intended and Prophylactic antiemetics administered. Anesthetic plan and risks discussed with patient. Plan discussed with CRNA.                   Miguel Cushing, MD   11/9/2021

## 2021-11-09 NOTE — PROGRESS NOTES
Pt awake and alert at this time. Pt on RA, and VSS. Pt denies nausea and pain is manageable, tolerating PO. Skin warm , drsg dry. Pt meets criteria to be discharged from Phase 1.

## 2021-11-09 NOTE — BRIEF OP NOTE
Brief Postoperative Note      Patient: Mago Hernandez  YOB: 1975  MRN: [id number]    Date of Procedure: 11/9/2021    Pre-Op Diagnosis: R22.1 MASS OF RIGHT SIDE OF NECK    Post-Op Diagnosis: Same       Procedure(s):  EXCISION OF RIGHT NECK MASS ; FROZEN SECTIONS    Surgeon(s):  Pablo Soto MD    Assistant:  First Assistant: Giovanna Haskins; Charlette Garcia    Anesthesia: General    Estimated Blood Loss (mL): less than 100 (one hundred)    Complications: None    Specimens:   ID Type Source Tests Collected by Time Destination   1 : 1.aerobic and anaerobic culture right neck mass  Tissue Tissue CULTURE, TISSUE Pablo Soto MD 11/9/2021 1245    A : A:RIGHT  NECK MASS RULE OUT LYMPHOMA  AREA DECOMPRESSED ON REMOVAL Tissue Tissue SURGICAL PATHOLOGY Pablo Soto MD 11/9/2021 1313    B : B LYMPHNODE FROM PAROTID TAIL AREA ADJACENT TO THE MAIN TUMOR MASS Tissue Tissue SURGICAL PATHOLOGY Pablo Soto MD 11/9/2021 1320    C : C LYMPHN NODE INFERIOR TO THE MAIN TUMOR MASS Tissue Tissue SURGICAL PATHOLOGY Pablo Soto MD 11/9/2021 1322        Implants:  * No implants in log *      Drains: * No LDAs found *    Findings: There was a large 2.5 x 4 cm firm rubbery mass anterior and deep to the SCM muscle and just lateral to and intimate to the internal jugular vein and anterior jugular vein and just lateral to the internal carotid artery and vagus nerve. The mass was intimately and tightly adherent and appeared to be invading the adjacent SCM muscle. A cuff of the adjacent SCM muscle was take in continuity with the mass. All other surrounding tissues appeared to be normal.  There was a cystic component to the mass which was ruptured during dissection and exuded a pale greeninsh white exudate which was not foul smelling. Cultured for aerobic, anaerobic, and acid fast bacteria, as well as fungus. There was an adjacent lymph node in the tail of parotid area that was excised.   There was also a similar node inferior to the mass that was excised. These were sent separately to pathology. The internal jugular vein and vagus nerve appeared to be normal with no surgical violation. There was one branch of the anterior jugular vein which was divided and bled significantly and was eventually ligated with 2-0 silk and and suture ligature of 2-0 silk placed. The other end appeared to enter the tumor mass.       Electronically signed by Jenifer Romo MD on 11/9/2021 at 1:43 PM

## 2021-11-09 NOTE — PROGRESS NOTES
Surgeon came back into room and requested acid fast and fungal culture to be added at 1353 to cultures that were sent 1250. Called lab @ [phone] at [phone] and a verbal order taken over phone with  to add acid fast and fungal cultures as well to the 1250 culture that was sent over. Surgeon made aware of contact with lab.

## 2021-11-11 ENCOUNTER — TELEPHONE (OUTPATIENT)
Dept: ENT CLINIC | Age: 46
End: 2021-11-11

## 2021-11-11 NOTE — TELEPHONE ENCOUNTER
I spoke to patient's . She had a severe sore throat this morning and had difficulty swallowing her pills. However the throat is feeling better currently with less pain. I advised her to use some Chloraseptic as needed for the pain in the throat as well as to do a warm saline gargle every 4 hours as needed. She can also try some warm broth or chicken noodle soup in the morning since she seems to have more trouble in the morning and advised that the warming of the throat as well as moisturizing of the throat may be helpful in the morning. I advised the patient's  to call the office tomorrow if her throat is worse. Also advised she can use the pain medication as needed for the throat pain. He stated that she has some swelling of the neck in the operative area.

## 2021-11-11 NOTE — TELEPHONE ENCOUNTER
is calling. Patient had surgery on Tuesday. Right Lymph node was removed. Currently right side is  swollen and she is unable to swallow easily. Please call , [facility], back.  [phone]

## 2021-11-14 ENCOUNTER — PATIENT MESSAGE (OUTPATIENT)
Dept: INTERNAL MEDICINE CLINIC | Age: 46
End: 2021-11-14

## 2021-11-14 DIAGNOSIS — I88.8 GRANULOMATOUS LYMPHADENITIS: Primary | ICD-10-CM

## 2021-11-14 LAB
ANAEROBIC CULTURE: NORMAL
CULTURE SURGICAL: NORMAL

## 2021-11-15 NOTE — PROGRESS NOTES
For the record, when I was doing post procedure computer work, I noticed that cultures for acid fast bacteria and fungus were not included in the original order. It is my usual practice to ALWAYS request those to be included for ALL cultures. When I noticed that had not been done, I went back into the room and requested that those be included as I had originally requested.

## 2021-11-17 ENCOUNTER — OFFICE VISIT (OUTPATIENT)
Dept: ENT CLINIC | Age: 46
End: 2021-11-17

## 2021-11-17 VITALS — HEART RATE: 73 BPM | DIASTOLIC BLOOD PRESSURE: 85 MMHG | SYSTOLIC BLOOD PRESSURE: 130 MMHG | TEMPERATURE: 97.3 F

## 2021-11-17 DIAGNOSIS — Z09 POSTOP CHECK: Primary | ICD-10-CM

## 2021-11-17 PROCEDURE — 99024 POSTOP FOLLOW-UP VISIT: CPT | Performed by: OTOLARYNGOLOGY

## 2021-11-17 NOTE — PROGRESS NOTES
POST-OP NOTE    Chief Complaint   Patient presents with    Post-Op Check         HISTORY:     POD #8  post excision of right lymph node on 11/09/2021. Had some swelling in the neck and difficulty swallowing initially but that is better now. Otherwise, she is doing well, with no current complaints. EXAMINATION:        Vitals:    11/17/21 1303   BP: 130/85   Pulse: 73   Temp: 97.3 °F (36.3 °C)      WDWN, awake and alert, with no evidence of distress. Neck:  Moderate swelling in the operated area. Incision intact, clean and dry with no evidence of infection. Facial nerve function intact on operated side. PATHOLOGY:  FINAL DIAGNOSIS:     A.  Right neck mass/lymph node, biopsy:   - Necrotizing granulomatous inflammation.  See comment.   - Reactive lymph node with follicular hyperplasia and sinus histiocytosis     B.  Lymph node, parotid tail adjacent to main lesion, biopsy:   - Reactive lymph node with sinus histiocytosis. C.  Lymph node, inferior to main lesion, biopsy:   - Lymphoid tissue with crush artifact. COMMENT:  The specimen (A) shows extensive necrosis that is surrounded by   fibrous tissue and skeletal muscle intermixed with chronic inflammatory   cells, predominantly lymphocytes and plasma cells.  Occasional multi   nucleated giant cells are present in the fibrous area.  No polarizing   foreign material, acid-fast bacilli or fungal elements are identified. The differential diagnosis of necrotizing lymphadenitis is wide,   including: infectious diseases (bacterial, viral, fungal or parasitic);   cat scratch disease, autoimmune disorders like rheumatoid arthritis and   systemic lupus erythematosus; idiopathic causes like Kikuchi's disease   and sarcoidosis; and malignant neoplasm.  No viable neoplastic cells are   seen in these specimens. Please be noted that negative AFB and GMS stain   does not entirely exclude acid-fast bacilli or fungal infection.    Clinical correlation is recommended.    ROSALINDA/ROSALINDA           IMPRESSION  Jabier Hernandez was seen today for post-op check. Diagnoses and all orders for this visit:    Postop check          RECOMMENDATIONS / PLAN:     Patient Instructions     1. Follow post op instructions in the after visit summary you were given after surgery. 2. Continue post-op medications as prescribed. Follow-up  Return in 5 days (on 11/22/2021) for post op check. 1.82

## 2021-11-17 NOTE — PATIENT INSTRUCTIONS
1. Follow post op instructions in the after visit summary you were given after surgery. 2. Continue post-op medications as prescribed.

## 2021-11-22 ENCOUNTER — OFFICE VISIT (OUTPATIENT)
Dept: ENT CLINIC | Age: 46
End: 2021-11-22

## 2021-11-22 VITALS — TEMPERATURE: 97.1 F | DIASTOLIC BLOOD PRESSURE: 74 MMHG | SYSTOLIC BLOOD PRESSURE: 115 MMHG | HEART RATE: 76 BPM

## 2021-11-22 DIAGNOSIS — I88.8 GRANULOMATOUS LYMPHADENITIS: ICD-10-CM

## 2021-11-22 DIAGNOSIS — Z09 POSTOP CHECK: Primary | ICD-10-CM

## 2021-11-22 DIAGNOSIS — R59.0 LYMPHADENOPATHY OF RIGHT CERVICAL REGION: ICD-10-CM

## 2021-11-22 PROCEDURE — 99024 POSTOP FOLLOW-UP VISIT: CPT | Performed by: OTOLARYNGOLOGY

## 2021-11-22 NOTE — PATIENT INSTRUCTIONS
1. Proceed with Rheumatology consult and Infectious Disease consultation. 2. Follow post op instructions in the after visit summary you were given after surgery. 3. Continue post-op medications as prescribed as needed.

## 2021-11-22 NOTE — PROGRESS NOTES
POST-OP NOTE    Chief Complaint   Patient presents with    Post-Op Check     pain and swelling          HISTORY:     POD #13  post excision of right lymph node on 11/09/2021. Having some right ear pain, 4-5/10. Swelling decreased a little. Numbness above the incision. Pain in shoulder. She had \"some numbness on the tip of my tongue right after surgery and that's gone. Otherwise, doing well, with no other complaints. EXAMINATION:        Vitals:    11/22/21 0809   BP: 115/74   Pulse: 76   Temp: 97.1 °F (36.2 °C)      WDWN, awake and alert, with no evidence of distress. Voice normal.  No trismus. Incision intact, clean and dry, dermabond . Able to protrude tongue straight and to left and right c/w intact hypoglossal nerve function. Facial nerve function intact and equal bilaterally for all branches. Marquis Sahu was seen today for post-op check. Diagnoses and all orders for this visit:    Postop check    Lymphadenopathy of right cervical region  -     Rossana Aranda MD, Infectious Disease, [facility]  Janessa Salgado MD, Rheumatology, [facility]    Granulomatous lymphadenitis  Comments:  necrotizing  Orders:  -     Rossana Aranda MD, Infectious Disease, [facility]  Janessa Salgado MD, Rheumatology, [facility]           RECOMMENDATIONS / PLAN:     Patient Instructions   1. Proceed with Rheumatology consult and Infectious Disease consultation. 2. Follow post op instructions in the after visit summary you were given after surgery. 3. Continue post-op medications as prescribed as needed. Follow-up  Return in about 1 month (around 12/22/2021) for recheck/follow-up, and sooner if condition worsens.

## 2021-12-08 ENCOUNTER — OFFICE VISIT (OUTPATIENT)
Dept: INTERNAL MEDICINE CLINIC | Age: 46
End: 2021-12-08
Payer: COMMERCIAL

## 2021-12-08 VITALS
HEIGHT: 62 IN | HEART RATE: 72 BPM | WEIGHT: 146 LBS | DIASTOLIC BLOOD PRESSURE: 82 MMHG | BODY MASS INDEX: 26.87 KG/M2 | SYSTOLIC BLOOD PRESSURE: 110 MMHG

## 2021-12-08 DIAGNOSIS — Z11.59 ENCOUNTER FOR HCV SCREENING TEST FOR LOW RISK PATIENT: ICD-10-CM

## 2021-12-08 DIAGNOSIS — Z00.00 ANNUAL PHYSICAL EXAM: Primary | ICD-10-CM

## 2021-12-08 DIAGNOSIS — F43.23 ADJUSTMENT DISORDER WITH MIXED ANXIETY AND DEPRESSED MOOD: ICD-10-CM

## 2021-12-08 DIAGNOSIS — Z23 NEED FOR INFLUENZA VACCINATION: ICD-10-CM

## 2021-12-08 DIAGNOSIS — R03.0 ELEVATED BP WITHOUT DIAGNOSIS OF HYPERTENSION: ICD-10-CM

## 2021-12-08 DIAGNOSIS — F41.1 GAD (GENERALIZED ANXIETY DISORDER): ICD-10-CM

## 2021-12-08 DIAGNOSIS — I88.8 GRANULOMATOUS LYMPHADENITIS: ICD-10-CM

## 2021-12-08 LAB
A/G RATIO: 1.6 (ref 1.1–2.2)
ALBUMIN SERPL-MCNC: 4.6 G/DL (ref 3.4–5)
ALP BLD-CCNC: 65 U/L (ref 40–129)
ALT SERPL-CCNC: 6 U/L (ref 10–40)
ANION GAP SERPL CALCULATED.3IONS-SCNC: 16 MMOL/L (ref 3–16)
AST SERPL-CCNC: 12 U/L (ref 15–37)
BASOPHILS ABSOLUTE: 0 K/UL (ref 0–0.2)
BASOPHILS RELATIVE PERCENT: 0.6 %
BILIRUB SERPL-MCNC: 0.4 MG/DL (ref 0–1)
BUN BLDV-MCNC: 14 MG/DL (ref 7–20)
C-REACTIVE PROTEIN: 13.1 MG/L (ref 0–5.1)
CALCIUM SERPL-MCNC: 10 MG/DL (ref 8.3–10.6)
CHLORIDE BLD-SCNC: 99 MMOL/L (ref 99–110)
CHOLESTEROL, FASTING: 282 MG/DL (ref 0–199)
CO2: 21 MMOL/L (ref 21–32)
CREAT SERPL-MCNC: 0.7 MG/DL (ref 0.6–1.1)
EOSINOPHILS ABSOLUTE: 0.1 K/UL (ref 0–0.6)
EOSINOPHILS RELATIVE PERCENT: 1.2 %
GFR AFRICAN AMERICAN: >60
GFR NON-AFRICAN AMERICAN: >60
GLUCOSE BLD-MCNC: 90 MG/DL (ref 70–99)
HCT VFR BLD CALC: 41.4 % (ref 36–48)
HDLC SERPL-MCNC: 124 MG/DL (ref 40–60)
HEMOGLOBIN: 13.4 G/DL (ref 12–16)
HEPATITIS C ANTIBODY INTERPRETATION: NORMAL
LDL CHOLESTEROL CALCULATED: 138 MG/DL
LYMPHOCYTES ABSOLUTE: 2.3 K/UL (ref 1–5.1)
LYMPHOCYTES RELATIVE PERCENT: 34.6 %
MCH RBC QN AUTO: 29.7 PG (ref 26–34)
MCHC RBC AUTO-ENTMCNC: 32.3 G/DL (ref 31–36)
MCV RBC AUTO: 92 FL (ref 80–100)
MONOCYTES ABSOLUTE: 0.5 K/UL (ref 0–1.3)
MONOCYTES RELATIVE PERCENT: 7 %
NEUTROPHILS ABSOLUTE: 3.7 K/UL (ref 1.7–7.7)
NEUTROPHILS RELATIVE PERCENT: 56.6 %
PDW BLD-RTO: 13.7 % (ref 12.4–15.4)
PLATELET # BLD: 331 K/UL (ref 135–450)
PMV BLD AUTO: 8.4 FL (ref 5–10.5)
POTASSIUM SERPL-SCNC: 4.4 MMOL/L (ref 3.5–5.1)
RBC # BLD: 4.5 M/UL (ref 4–5.2)
RHEUMATOID FACTOR: <10 IU/ML
SEDIMENTATION RATE, ERYTHROCYTE: 15 MM/HR (ref 0–20)
SODIUM BLD-SCNC: 136 MMOL/L (ref 136–145)
TOTAL PROTEIN: 7.5 G/DL (ref 6.4–8.2)
TRIGLYCERIDE, FASTING: 101 MG/DL (ref 0–150)
TSH REFLEX: 1.53 UIU/ML (ref 0.27–4.2)
VLDLC SERPL CALC-MCNC: 20 MG/DL
WBC # BLD: 6.6 K/UL (ref 4–11)

## 2021-12-08 PROCEDURE — 99396 PREV VISIT EST AGE 40-64: CPT | Performed by: FAMILY MEDICINE

## 2021-12-08 PROCEDURE — 90674 CCIIV4 VAC NO PRSV 0.5 ML IM: CPT | Performed by: FAMILY MEDICINE

## 2021-12-08 PROCEDURE — 90471 IMMUNIZATION ADMIN: CPT | Performed by: FAMILY MEDICINE

## 2021-12-08 RX ORDER — SERTRALINE HYDROCHLORIDE 100 MG/1
TABLET, FILM COATED ORAL
Qty: 90 TABLET | Refills: 3 | Status: SHIPPED | OUTPATIENT
Start: 2021-12-08 | End: 2022-10-07

## 2021-12-08 RX ORDER — BISOPROLOL FUMARATE AND HYDROCHLOROTHIAZIDE 2.5; 6.25 MG/1; MG/1
TABLET ORAL
Qty: 90 TABLET | Refills: 3 | Status: SHIPPED | OUTPATIENT
Start: 2021-12-08

## 2021-12-08 RX ORDER — BUPROPION HYDROCHLORIDE 150 MG/1
150 TABLET ORAL EVERY MORNING
Qty: 30 TABLET | Refills: 5 | Status: SHIPPED | OUTPATIENT
Start: 2021-12-08 | End: 2022-04-04

## 2021-12-08 SDOH — ECONOMIC STABILITY: FOOD INSECURITY: WITHIN THE PAST 12 MONTHS, THE FOOD YOU BOUGHT JUST DIDN'T LAST AND YOU DIDN'T HAVE MONEY TO GET MORE.: NEVER TRUE

## 2021-12-08 SDOH — ECONOMIC STABILITY: FOOD INSECURITY: WITHIN THE PAST 12 MONTHS, YOU WORRIED THAT YOUR FOOD WOULD RUN OUT BEFORE YOU GOT MONEY TO BUY MORE.: NEVER TRUE

## 2021-12-08 ASSESSMENT — SOCIAL DETERMINANTS OF HEALTH (SDOH): HOW HARD IS IT FOR YOU TO PAY FOR THE VERY BASICS LIKE FOOD, HOUSING, MEDICAL CARE, AND HEATING?: NOT HARD AT ALL

## 2021-12-08 NOTE — PROGRESS NOTES
Chief Complaint   Patient presents with    Annual Exam     fasting today. PREVENTATIVE VISIT AND EXAM      Lots of stress. Her swollen neck mass --- cause not known but negative for malignancy. Will see ID and then rheumatology. Her daughter is in psych unit at [facility] this week. She is hopeful. Her son is sentenced to [facility] for 4 years. She declines need for change in or additional psych meds or therapy referral.          Patient Active Problem List   Diagnosis    Mitral valve prolapse    Adjustment disorder with mixed anxiety and depressed mood    Elevated BP without diagnosis of hypertension    ARA (generalized anxiety disorder)    Migraine without status migrainosus, not intractable    Numbness    Daytime sleepiness    Allergic rhinitis    Obstructive sleep apnea    Mass of right side of neck       Past Medical History:   Diagnosis Date    Labile hypertension     appears to be stress related vs white coat    Mitral valve prolapse      Past Surgical History:   Procedure Laterality Date    NECK SURGERY N/A 11/9/2021    EXCISION OF RIGHT NECK MASS ; FROZEN SECTIONS performed by Wilfredo Franco MD at [facility] EXTRACTION       Family History   Problem Relation Age of Onset    Asthma Mother     Rheum Arthritis Mother     Osteoporosis Mother     Depression Mother     High Blood Pressure Father     Other Father         IBS vs IBD    Thyroid Disease Sister     Other Brother         IBS    Heart Disease Paternal Grandfather     Asthma Son         outgrown    Anxiety Disorder Son         or other disorder;  behavorial issues.  Anesth Problems Neg Hx     Clotting Disorder Neg Hx     Bleeding Prob Neg Hx      Social History     Tobacco Use    Smoking status: Never Smoker    Smokeless tobacco: Never Used   Vaping Use    Vaping Use: Never used   Substance Use Topics    Alcohol use:  Yes     Alcohol/week: 1.0 - 5.0 standard drink     Types: 1 - 5 Standard drinks or equivalent per week     Comment: lately 1 per week    Drug use: No       Outpatient Medications Marked as Taking for the 12/8/21 encounter (Office Visit) with Yeimi Mckeon MD   Medication Sig Dispense Refill    bisoprolol-hydroCHLOROthiazide (ZIAC) 2.5-6.25 MG per tablet TAKE 1 TABLET BY MOUTH EVERY DAY 90 tablet 3    sertraline (ZOLOFT) 100 MG tablet TAKE 1 TABLET BY MOUTH EVERY DAY 90 tablet 3    LORYNA 3-0.02 MG per tablet Take 1 tablet by mouth daily           ROS:  Full 12 system review done and all negative except for the following:   Stress, sleepy during day, snores (has mod JACK but not on treatment), hot flashes, memory trouble. Anxiety, stress --  See HPI     Physical Exam   Vitals:    12/08/21 0933   BP: 110/82   Pulse: 72      Body mass index is 26.7 kg/m². Wt Readings from Last 3 Encounters:   12/08/21 146 lb (66.2 kg)   11/09/21 143 lb (64.9 kg)   11/04/21 144 lb (65.3 kg)         Constitutional: Oriented to person, place, and time. Appears well-developed and well-nourished. No distress. HENT:   Head: Normocephalic and atraumatic. Ears: Hearing, tympanic membrane, external ear and ear canal normal.   Nose: Nose normal.   Mouth/Throat: Uvula is midline, oropharynx is clear and moist and mucous membranes are normal.   Eyes: Conjunctivae and EOM are normal. Pupils are equal, round, and reactive to light. No scleral icterus. Neck: Normal range of motion. Neck supple. Normal carotid pulses and no JVD present. Carotid bruit is not present. No tracheal deviation present. No mass and no thyromegaly present. Cardiovascular: Normal rate, regular rhythm, S1 normal, S2 normal, normal heart sounds and intact distal pulses. No murmur heard. Pulmonary/Chest: Effort normal and breath sounds normal. No stridor. No respiratory distress. No decreased breath sounds. No wheezes. No rhonchi. No rales. Abdominal: Soft.  Normal appearance and bowel sounds are normal. No distension, no abdominal bruit and no mass. There is no hepatomegaly. No tenderness. Musculoskeletal: Normal range of motion. No edema. Neurological: Alert and oriented to person, place, and time. Normal reflexes. No tremor. No cranial nerve deficit. She exhibits normal muscle tone. Coordination and gait normal.   Skin: Skin is warm and dry. No rash noted. Not diaphoretic. No cyanosis. No pallor. Nails show no clubbing. Psychiatric:  Normal mood and affect. Speech is normal and behavior is normal. Cognition and memory are normal.         Assessment:      1. Annual physical exam  Update HM issues. Encouraged to treat her JACK  --- even oral appliance if cannot tolerate CPAP   - Comprehensive Metabolic Panel  - Lipid, Fasting  - TSH with Reflex  - CBC Auto Differential  - Hep C    2. Adjustment disorder with mixed anxiety and depressed mood  Add med for daytime alertness, stress related issues and weight gain. Notify me if any problems. - buPROPion (WELLBUTRIN XL) 150 MG extended release tablet; Take 1 tablet by mouth every morning  Dispense: 30 tablet; Refill: 5    3. ARA (generalized anxiety disorder)  - sertraline (ZOLOFT) 100 MG tablet; TAKE 1 TABLET BY MOUTH EVERY DAY  Dispense: 90 tablet; Refill: 3  - TSH with Reflex    4. Elevated BP without diagnosis of hypertension  BP is doing well at this time. - bisoprolol-hydroCHLOROthiazide (ZIAC) 2.5-6.25 MG per tablet; TAKE 1 TABLET BY MOUTH EVERY DAY  Dispense: 90 tablet; Refill: 3  - TSH with Reflex    Instructed to monitor BP with addition of Wellbutrin = buproprion. Health Maintenance Due   Topic Date Due    Hepatitis C screen  Never done    COVID-19 Vaccine (3 - Booster for Pfizer series) 03/24/2021    Flu vaccine (1) 09/01/2021       Plan:    See orders and patient instructions. Age-appropriate preventative issues discussed and hand out given.     Follow up:  Return in about 1 year (around 12/8/2022) for physical but send me a My Chart message in 6 weeks or sooner about new medication. Rashid Lodr

## 2021-12-08 NOTE — PATIENT INSTRUCTIONS
Take the Buproprion in the morning. Monitor your BP if able. Dr. Doe Patient at [facility] is apparently good for sleep apnea mouth appliances.

## 2021-12-09 LAB
ANTI-DSDNA IGG: 1 IU/ML (ref 0–9)
ANTI-NUCLEAR ANTIBODY (ANA): NEGATIVE
CYCLIC CITRULLINATED PEPTIDE ANTIBODY IGG: <0.5 U/ML (ref 0–2.9)

## 2021-12-12 LAB
QUANTI TB GOLD PLUS: NEGATIVE
QUANTI TB1 MINUS NIL: 0 IU/ML (ref 0–0.34)
QUANTI TB2 MINUS NIL: 0 IU/ML (ref 0–0.34)
QUANTIFERON MITOGEN: 8.53 IU/ML
QUANTIFERON NIL: 0.01 IU/ML

## 2021-12-13 LAB
FUNGUS (MYCOLOGY) CULTURE: NORMAL
FUNGUS STAIN: NORMAL

## 2021-12-17 ENCOUNTER — TELEPHONE (OUTPATIENT)
Dept: ENT CLINIC | Age: 46
End: 2021-12-17

## 2021-12-17 NOTE — TELEPHONE ENCOUNTER
Pt.had surgery Nov.9 she said everything is doing okay until today her neck is a lot more red it is inflamed and she wanted to call and leave this message for .      Next apt.12/22

## 2021-12-18 ENCOUNTER — TELEPHONE (OUTPATIENT)
Dept: ENT CLINIC | Age: 46
End: 2021-12-18

## 2021-12-18 RX ORDER — CEPHALEXIN 500 MG/1
500 CAPSULE ORAL 3 TIMES DAILY
Qty: 21 CAPSULE | Refills: 0 | Status: SHIPPED | OUTPATIENT
Start: 2021-12-18 | End: 2021-12-25

## 2021-12-20 ENCOUNTER — TELEPHONE (OUTPATIENT)
Dept: ENT CLINIC | Age: 46
End: 2021-12-20

## 2021-12-20 NOTE — TELEPHONE ENCOUNTER
Patient had surgery (Right Neck Mass) on 11/9/2021. Patient is stating that the incision site is constantly oozing. Patient has an appointment on 12/22/2021 at 8:30am.       Patient was prescribed Cephalexin 500 mg Oral 3 TIMES DAILY on 12/18/2021 from Dr. Serena Marie. Patient is wanting to know if she should be seen sooner or what we need to do.

## 2021-12-20 NOTE — TELEPHONE ENCOUNTER
Phone call. Spoke to patient. She stated that she has had some mild amount of drainage from one area of the incision and that the entire incision is slightly red. She did call the on-call physician over the weekend who called in an antibiotic but she stated she did not fill this because she feels she has been on so many antibiotics she did not want to start a new one yet until she sees me. She has appointment see me on [phone]. Advised her to keep the draining area covered with gauze dressing and to call if the condition worsens. Otherwise I will see her on 12/22/2021.

## 2021-12-21 NOTE — TELEPHONE ENCOUNTER
I spoke to the patient last night on the telephone. She is going to keep it covered until she sees me on 12/22/2021. She stated she will call if condition worsens. Zachery Villagomez

## 2021-12-22 ENCOUNTER — OFFICE VISIT (OUTPATIENT)
Dept: ENT CLINIC | Age: 46
End: 2021-12-22

## 2021-12-22 VITALS — DIASTOLIC BLOOD PRESSURE: 91 MMHG | HEART RATE: 79 BPM | TEMPERATURE: 97.1 F | SYSTOLIC BLOOD PRESSURE: 133 MMHG

## 2021-12-22 DIAGNOSIS — T81.49XA WOUND INFECTION AFTER SURGERY: Primary | ICD-10-CM

## 2021-12-22 PROCEDURE — 99024 POSTOP FOLLOW-UP VISIT: CPT | Performed by: OTOLARYNGOLOGY

## 2021-12-22 ASSESSMENT — ENCOUNTER SYMPTOMS
SORE THROAT: 0
RHINORRHEA: 0

## 2021-12-22 NOTE — PROGRESS NOTES
[facility]       PCP:  Belen Barnett MD      CHIEF COMPLAINT  Chief Complaint   Patient presents with    Wound Infection     right side of neck in operated site       [employer]       Tanja Aldridge is a 42 y.o. female here for evaluation and treatment of a swelling and drainage from the left neck operative site. Had some oozing of the wound for about 5 days, only from the bottom of the wound. More sensitive and more red and puffy. No problems prior to 5 days ago. Drains randomly and can be a lot when it does drain, \"can be a pretty good amount. \"  Has had muslce pain in neck and shoulder since surgery but worse in the past week. Underwent EXCISION OF RIGHT NECK MASS on 11/9/2021. Dr. Maruyri Herbert wants to do a PET scan and CT scan. REVIEW OF SYSTEMS   Review of Systems   Constitutional: Negative for chills and fever. HENT: Positive for congestion (\"I have a cold right now. couple days\" ). Negative for ear discharge, ear pain, rhinorrhea and sore throat. PAST MEDICAL HISTORY    Past Medical History:   Diagnosis Date    Labile hypertension     appears to be stress related vs white coat    Mitral valve prolapse        Past Surgical History:   Procedure Laterality Date    NECK SURGERY N/A 11/9/2021    EXCISION OF RIGHT NECK MASS ; FROZEN SECTIONS performed by Han Moore MD at [address] [place] [address] Road:    12/22/21 7475   BP: (!) 133/91   Site: Left Upper Arm   Position: Sitting   Cuff Size: Medium Adult   Pulse: 79   Temp: 97.1 °F (36.2 °C)   TempSrc: Temporal       Physical Exam  Vitals reviewed. Constitutional:       General: She is not in acute distress. Appearance: Normal appearance. She is not ill-appearing. HENT:      Head: Normocephalic.    Neck:      Comments: Incision appears to be intact with a mild yellowish crust on the lower area of the incision and no dehiscence or open wound and no drainage even with massage and milking the tissues. There is mild subcutaneous induration/scar. The area was mildly tender but no evidence of abscess. Musculoskeletal:      Cervical back: Full passive range of motion without pain. Lymphadenopathy:      Cervical: No cervical adenopathy. Neurological:      Mental Status: She is alert. Meño Martínez / Kelvin Hernandez / Aaron Webb was seen today for wound infection. Diagnoses and all orders for this visit:    Wound infection after surgery         RECOMMENDATIONS/PLAN      1. Acetaminophen (eg. Tylenol) or Ibuprofen (eg. Advil) (over the counter medications) as needed for pain. 2. Return in about 2 weeks (around 1/5/2022) for recheck/follow-up, and sooner if condition worsens. Patient Instructions   1. Proceed with antibiotics ordered by ENT on call. 2. Warm compress or heating pad to neck area three times daily. Heating pad therapy:  Apply heat to the affected area as directed, four times daily, on medium heat setting, for 30 to 45 minutes, with a one minute break every 5 minutes.

## 2021-12-22 NOTE — PATIENT INSTRUCTIONS
1. Proceed with antibiotics ordered by ENT on call. 2. Warm compress or heating pad to neck area three times daily. Heating pad therapy:  Apply heat to the affected area as directed, four times daily, on medium heat setting, for 30 to 45 minutes, with a one minute break every 5 minutes.

## 2021-12-28 LAB
AFB CULTURE (MYCOBACTERIA): NORMAL
AFB SMEAR: NORMAL

## 2022-01-13 ENCOUNTER — OFFICE VISIT (OUTPATIENT)
Dept: ENT CLINIC | Age: 47
End: 2022-01-13

## 2022-01-13 ENCOUNTER — HOSPITAL ENCOUNTER (OUTPATIENT)
Age: 47
Discharge: HOME | End: 2022-01-13
Payer: COMMERCIAL

## 2022-01-13 VITALS
SYSTOLIC BLOOD PRESSURE: 127 MMHG | OXYGEN SATURATION: 98 % | TEMPERATURE: 96.9 F | HEART RATE: 66 BPM | DIASTOLIC BLOOD PRESSURE: 65 MMHG

## 2022-01-13 DIAGNOSIS — I88.8 GRANULOMATOUS LYMPHADENITIS: ICD-10-CM

## 2022-01-13 DIAGNOSIS — T81.49XA WOUND INFECTION AFTER SURGERY: Primary | ICD-10-CM

## 2022-01-13 DIAGNOSIS — R59.1 LYMPHADENOPATHY: Primary | ICD-10-CM

## 2022-01-13 DIAGNOSIS — R59.1 LYMPHADENOPATHY: ICD-10-CM

## 2022-01-13 PROCEDURE — 87798 DETECT AGENT NOS DNA AMP: CPT

## 2022-01-13 PROCEDURE — 86606 ASPERGILLUS ANTIBODY: CPT

## 2022-01-13 PROCEDURE — 87116 MYCOBACTERIA CULTURE: CPT

## 2022-01-13 PROCEDURE — 86701 HIV-1ANTIBODY: CPT

## 2022-01-13 PROCEDURE — 99024 POSTOP FOLLOW-UP VISIT: CPT | Performed by: OTOLARYNGOLOGY

## 2022-01-13 PROCEDURE — 36415 COLL VENOUS BLD VENIPUNCTURE: CPT

## 2022-01-13 PROCEDURE — 87390 HIV-1 AG IA: CPT

## 2022-01-13 PROCEDURE — 86698 HISTOPLASMA ANTIBODY: CPT

## 2022-01-13 PROCEDURE — 87305 ASPERGILLUS AG IA: CPT

## 2022-01-13 PROCEDURE — 87327 CRYPTOCOCCUS NEOFORM AG IA: CPT

## 2022-01-13 PROCEDURE — 86612 BLASTOMYCES ANTIBODY: CPT

## 2022-01-13 PROCEDURE — 87103 BLOOD FUNGUS CULTURE: CPT

## 2022-01-13 PROCEDURE — 87385 HISTOPLASMA CAPSUL AG IA: CPT

## 2022-01-13 PROCEDURE — 86635 COCCIDIOIDES ANTIBODY: CPT

## 2022-01-13 PROCEDURE — 87040 BLOOD CULTURE FOR BACTERIA: CPT

## 2022-01-13 PROCEDURE — 86702 HIV-2 ANTIBODY: CPT

## 2022-01-13 ASSESSMENT — ENCOUNTER SYMPTOMS
SORE THROAT: 0
SINUS PAIN: 0
RHINORRHEA: 0

## 2022-01-13 NOTE — PATIENT INSTRUCTIONS
1. Apply polysporin to the lower area of your incision for 5 days. 2. Check your lymph node once or twice monthly and report any enlargement to 2 to 3 times the current size, change to rock hard consistency, or fixation (signs of possible malignancy). Call if these changes occur.

## 2022-01-13 NOTE — PROGRESS NOTES
[facility]         PCP:  Aaron Soto MD      CHIEF COMPLAINT  Chief Complaint   Patient presents with    Post-op Problem     possible wound infection after surgery, feeling better        HISTORY OF PRESENT ILLNESS       Sherita Donovan is a 42 y.o. female here for evaluation and treatment of possible wound infection. No drainage since about 7 days ago. Having no pain, swelling, or redness. Underwent excision of right neck mass on 11/9/2021. REVIEW OF SYSTEMS   Review of Systems   Constitutional: Negative for chills and fever. HENT: Negative for ear discharge, ear pain, rhinorrhea, sinus pain and sore throat. PAST MEDICAL HISTORY    Past Medical History:   Diagnosis Date    Labile hypertension     appears to be stress related vs white coat    Mitral valve prolapse        Past Surgical History:   Procedure Laterality Date    NECK SURGERY N/A 11/9/2021    EXCISION OF RIGHT NECK MASS ; FROZEN SECTIONS performed by Jamari Vanegas MD at [address] [place] [address] Road:    01/13/22 0917   BP: 127/65   Site: Left Upper Arm   Position: Sitting   Cuff Size: Medium Adult   Pulse: 66   Temp: 96.9 °F (36.1 °C)   TempSrc: Temporal   SpO2: 98%       Physical Exam  Vitals reviewed. Constitutional:       General: She is not in acute distress. Appearance: Normal appearance. She is not ill-appearing. HENT:      Head: Normocephalic. Neck:      Comments: No cervical masses or tenderness. No active drainage. Neurological:      Mental Status: She is alert. Be Cuenca / Bret Medel / Hannah Garza was seen today for post-op problem. Diagnoses and all orders for this visit:    Wound infection after surgery    Granulomatous lymphadenitis           RECOMMENDATIONS/PLAN     Return for any further ENT or sinus problems or symptoms.         Patient Instructions 1. Apply polysporin to the lower area of your incision for 5 days. 2. Check your lymph node once or twice monthly and report any enlargement to 2 to 3 times the current size, change to rock hard consistency, or fixation (signs of possible malignancy). Call if these changes occur.

## 2022-01-14 ENCOUNTER — TELEPHONE (OUTPATIENT)
Dept: INFECTIOUS DISEASES | Age: 47
End: 2022-01-14

## 2022-01-14 LAB
HIV AG/AB: NORMAL
HIV ANTIGEN: NORMAL
HIV-1 ANTIBODY: NORMAL
HIV-2 AB: NORMAL

## 2022-01-14 NOTE — TELEPHONE ENCOUNTER
Angella with [facility] ([phone]) Contacted this office to ask about results concerning order  [address] stated this will result in the blood cultures fungus  Luiz Mcqueen wanted to know if a PCR is wanted by Dr Annmarie Dash

## 2022-01-14 NOTE — TELEPHONE ENCOUNTER
Received confirmation from   Dr Cristóbal Fritz to add PCR to lab in [facility] with [facility] notified

## 2022-01-15 LAB — CRYPTOCOCCAL ANTIGEN: NEGATIVE

## 2022-01-16 LAB
HISTOPLASMA ANTIGEN URINE INTERP: NOT DETECTED
HISTOPLASMA ANTIGEN URINE: NOT DETECTED NG/ML

## 2022-01-17 ENCOUNTER — TELEPHONE (OUTPATIENT)
Dept: INFECTIOUS DISEASES | Age: 47
End: 2022-01-17

## 2022-01-17 DIAGNOSIS — R22.1 MASS OF RIGHT SIDE OF NECK: Primary | ICD-10-CM

## 2022-01-17 LAB
ASPERGILLUS ANTIBODY CF: NORMAL
ASPERGILLUS ANTIBODY ID: NORMAL
BLASTOMYCES AB BY EIA, SERUM: 0.8 IV
BLASTOMYCES ANTIBODY ID: NORMAL
BLOOD CULTURE, ROUTINE: NORMAL
COCCIDIOIDES ANTIBODY CF: NORMAL
COCCIDIOIDES ANTIBODY ID: NORMAL
CULTURE, BLOOD 2: NORMAL
HISTOPLASMA ABS, ID: NORMAL
HISTOPLASMA ANTIBODY MYCELIAL CF: NORMAL
HISTOPLASMA ANTIBODY YEAST CF: NORMAL
HISTOPLASMA ANTIGEN, SERUM: NOT DETECTED
HISTOPLASMA INTERPETATION: NOT DETECTED

## 2022-01-19 ENCOUNTER — TELEPHONE (OUTPATIENT)
Dept: INFECTIOUS DISEASES | Age: 47
End: 2022-01-19

## 2022-01-19 DIAGNOSIS — R59.1 LYMPHADENOPATHY: Primary | ICD-10-CM

## 2022-01-19 LAB
TOXOPLASMA GONDII PCR: NOT DETECTED
TOXOPLASMA GONDII SOURCE: NORMAL

## 2022-01-19 NOTE — TELEPHONE ENCOUNTER
[address] [place] [address] sent fax back regarding PET CT Whole Body and asked if order could be updated to  mid skull to mid thigh to get scheduled for patient.   Thank you

## 2022-01-22 LAB
ASPERGILLUS GALACTO AG: NEGATIVE
ASPERGILLUS GALACTO INDEX: 0.05

## 2022-01-24 ENCOUNTER — HOSPITAL ENCOUNTER (OUTPATIENT)
Dept: PET IMAGING | Age: 47
Discharge: HOME | End: 2022-01-24
Payer: COMMERCIAL

## 2022-01-24 DIAGNOSIS — R59.1 LYMPHADENOPATHY: ICD-10-CM

## 2022-01-24 PROCEDURE — 78815 PET IMAGE W/CT SKULL-THIGH: CPT

## 2022-01-24 PROCEDURE — A9552 F18 FDG: HCPCS | Performed by: INTERNAL MEDICINE

## 2022-01-24 PROCEDURE — 3430000000 HC RX DIAGNOSTIC RADIOPHARMACEUTICAL: Performed by: INTERNAL MEDICINE

## 2022-01-24 RX ORDER — FLUDEOXYGLUCOSE F 18 200 MCI/ML
15.06 INJECTION, SOLUTION INTRAVENOUS
Status: COMPLETED | OUTPATIENT
Start: 2022-01-24 | End: 2022-01-24

## 2022-01-24 RX ADMIN — FLUDEOXYGLUCOSE F 18 15.06 MILLICURIE: 200 INJECTION, SOLUTION INTRAVENOUS at 09:52

## 2022-02-01 ENCOUNTER — HOSPITAL ENCOUNTER (OUTPATIENT)
Dept: CT IMAGING | Age: 47
Discharge: HOME | End: 2022-02-01
Payer: COMMERCIAL

## 2022-02-01 DIAGNOSIS — R59.1 LYMPHADENOPATHY: ICD-10-CM

## 2022-02-01 LAB
CREAT SERPL-MCNC: 0.7 MG/DL (ref 0.6–1.1)
GFR AFRICAN AMERICAN: >60
GFR NON-AFRICAN AMERICAN: >60

## 2022-02-01 PROCEDURE — 82565 ASSAY OF CREATININE: CPT

## 2022-02-01 PROCEDURE — 36415 COLL VENOUS BLD VENIPUNCTURE: CPT

## 2022-02-01 PROCEDURE — 6360000004 HC RX CONTRAST MEDICATION: Performed by: INTERNAL MEDICINE

## 2022-02-01 PROCEDURE — 74177 CT ABD & PELVIS W/CONTRAST: CPT

## 2022-02-01 RX ADMIN — IOPAMIDOL 75 ML: 755 INJECTION, SOLUTION INTRAVENOUS at 08:35

## 2022-02-01 RX ADMIN — IOHEXOL 50 ML: 240 INJECTION, SOLUTION INTRATHECAL; INTRAVASCULAR; INTRAVENOUS; ORAL at 08:35

## 2022-02-02 ENCOUNTER — PATIENT MESSAGE (OUTPATIENT)
Dept: INFECTIOUS DISEASES | Age: 47
End: 2022-02-02

## 2022-02-02 DIAGNOSIS — R91.1 INCIDENTAL LUNG NODULE, LESS THAN OR EQUAL TO 3MM: Primary | ICD-10-CM

## 2022-02-07 NOTE — TELEPHONE ENCOUNTER
From: Nabil Cuirel  To: Dr. Jose Lord: 2/2/2022 10:48 AM EST  Subject: My CT results-question    Hello Dr. Erin Medellin,  I received your message regarding my ct results. I do have one other question. I noticed that it stated that Calcified granulomatous disease is noted. I'm wondering what that means and if there is any further information you can give. Is this a concern?   Thank you,  Lana He

## 2022-02-14 LAB — CULTURE, FUNGUS BLOOD: NORMAL

## 2022-02-28 ENCOUNTER — TELEPHONE (OUTPATIENT)
Dept: INTERNAL MEDICINE CLINIC | Age: 47
End: 2022-02-28

## 2022-02-28 NOTE — TELEPHONE ENCOUNTER
Pt calling ---has hemroids and the over the counter medications are no longer helping---asking for recommendation on what she should do now---please call pt. Thanks.

## 2022-02-28 NOTE — TELEPHONE ENCOUNTER
HEMORRHOID TREATMENT. FOR PAIN:  get LIDOCAINE cream over the counter and use as needed. TUCKS or [place] Bowels for swelling and to help clean after bowel movement. Rest of lay on your left side as much as possible. Take a fiber supplement like Metamucil, methylcellulose (Citrucel), Fibercon or Benefiber daily. If prone to hemorrhoids or need to strain for bowel movements, stay on this long term.   Take a Stool softener such as colace for less pain w/ BM   Push fluids

## 2022-03-01 LAB — CULTURE, AFB BLOOD: NORMAL

## 2022-04-03 DIAGNOSIS — F43.23 ADJUSTMENT DISORDER WITH MIXED ANXIETY AND DEPRESSED MOOD: ICD-10-CM

## 2022-04-04 RX ORDER — BUPROPION HYDROCHLORIDE 150 MG/1
TABLET ORAL
Qty: 90 TABLET | Refills: 1 | Status: SHIPPED | OUTPATIENT
Start: 2022-04-04 | End: 2022-10-10

## 2022-06-04 DIAGNOSIS — J30.9 ALLERGIC RHINITIS, UNSPECIFIED SEASONALITY, UNSPECIFIED TRIGGER: ICD-10-CM

## 2022-06-06 RX ORDER — MONTELUKAST SODIUM 10 MG/1
10 TABLET ORAL DAILY
Qty: 90 TABLET | Refills: 3 | Status: SHIPPED | OUTPATIENT
Start: 2022-06-06

## 2022-09-12 ENCOUNTER — TELEPHONE (OUTPATIENT)
Dept: INTERNAL MEDICINE CLINIC | Age: 47
End: 2022-09-12

## 2022-09-12 NOTE — TELEPHONE ENCOUNTER
Pt calling and her anxiety lately has been high and asking if possibly the Sertraline dose can be raised---has been on this dose for a long time and isn't helping like it was---please call the pt. Thanks.

## 2022-10-07 DIAGNOSIS — F41.1 GAD (GENERALIZED ANXIETY DISORDER): ICD-10-CM

## 2022-10-07 RX ORDER — SERTRALINE HYDROCHLORIDE 100 MG/1
TABLET, FILM COATED ORAL
Qty: 90 TABLET | Refills: 3 | Status: SHIPPED | OUTPATIENT
Start: 2022-10-07

## 2022-10-09 DIAGNOSIS — F43.23 ADJUSTMENT DISORDER WITH MIXED ANXIETY AND DEPRESSED MOOD: ICD-10-CM

## 2022-10-10 RX ORDER — BUPROPION HYDROCHLORIDE 150 MG/1
TABLET ORAL
Qty: 90 TABLET | Refills: 1 | Status: SHIPPED | OUTPATIENT
Start: 2022-10-10 | End: 2022-10-11

## 2022-10-11 ENCOUNTER — TELEMEDICINE (OUTPATIENT)
Dept: INTERNAL MEDICINE CLINIC | Age: 47
End: 2022-10-11
Payer: COMMERCIAL

## 2022-10-11 DIAGNOSIS — F32.2 CURRENT SEVERE EPISODE OF MAJOR DEPRESSIVE DISORDER WITHOUT PSYCHOTIC FEATURES, UNSPECIFIED WHETHER RECURRENT (HCC): Primary | ICD-10-CM

## 2022-10-11 DIAGNOSIS — F43.23 ADJUSTMENT DISORDER WITH MIXED ANXIETY AND DEPRESSED MOOD: ICD-10-CM

## 2022-10-11 DIAGNOSIS — R41.840 ATTENTION AND CONCENTRATION DEFICIT: ICD-10-CM

## 2022-10-11 PROBLEM — R61 NIGHT SWEATS: Status: ACTIVE | Noted: 2020-12-01

## 2022-10-11 PROCEDURE — 99214 OFFICE O/P EST MOD 30 MIN: CPT | Performed by: FAMILY MEDICINE

## 2022-10-11 RX ORDER — ATOMOXETINE 40 MG/1
40-80 CAPSULE ORAL DAILY
Qty: 30 CAPSULE | Refills: 3 | Status: SHIPPED | OUTPATIENT
Start: 2022-10-11

## 2022-10-11 RX ORDER — BUPROPION HYDROCHLORIDE 150 MG/1
150 TABLET ORAL EVERY OTHER DAY
Qty: 4 TABLET | Refills: 0
Start: 2022-10-11 | End: 2022-10-21 | Stop reason: ALTCHOICE

## 2022-10-11 ASSESSMENT — PATIENT HEALTH QUESTIONNAIRE - PHQ9
SUM OF ALL RESPONSES TO PHQ QUESTIONS 1-9: 20
5. POOR APPETITE OR OVEREATING: 3
4. FEELING TIRED OR HAVING LITTLE ENERGY: 3
3. TROUBLE FALLING OR STAYING ASLEEP: 3
SUM OF ALL RESPONSES TO PHQ9 QUESTIONS 1 & 2: 5
1. LITTLE INTEREST OR PLEASURE IN DOING THINGS: 3
SUM OF ALL RESPONSES TO PHQ QUESTIONS 1-9: 20
9. THOUGHTS THAT YOU WOULD BE BETTER OFF DEAD, OR OF HURTING YOURSELF: 0
2. FEELING DOWN, DEPRESSED OR HOPELESS: 2
7. TROUBLE CONCENTRATING ON THINGS, SUCH AS READING THE NEWSPAPER OR WATCHING TELEVISION: 3
8. MOVING OR SPEAKING SO SLOWLY THAT OTHER PEOPLE COULD HAVE NOTICED. OR THE OPPOSITE, BEING SO FIGETY OR RESTLESS THAT YOU HAVE BEEN MOVING AROUND A LOT MORE THAN USUAL: 0
6. FEELING BAD ABOUT YOURSELF - OR THAT YOU ARE A FAILURE OR HAVE LET YOURSELF OR YOUR FAMILY DOWN: 3
10. IF YOU CHECKED OFF ANY PROBLEMS, HOW DIFFICULT HAVE THESE PROBLEMS MADE IT FOR YOU TO DO YOUR WORK, TAKE CARE OF THINGS AT HOME, OR GET ALONG WITH OTHER PEOPLE: 2
SUM OF ALL RESPONSES TO PHQ QUESTIONS 1-9: 20
SUM OF ALL RESPONSES TO PHQ QUESTIONS 1-9: 20

## 2022-10-11 ASSESSMENT — ANXIETY QUESTIONNAIRES
6. BECOMING EASILY ANNOYED OR IRRITABLE: 1
4. TROUBLE RELAXING: 1
5. BEING SO RESTLESS THAT IT IS HARD TO SIT STILL: 0
1. FEELING NERVOUS, ANXIOUS, OR ON EDGE: 2
7. FEELING AFRAID AS IF SOMETHING AWFUL MIGHT HAPPEN: 1
2. NOT BEING ABLE TO STOP OR CONTROL WORRYING: 3
IF YOU CHECKED OFF ANY PROBLEMS ON THIS QUESTIONNAIRE, HOW DIFFICULT HAVE THESE PROBLEMS MADE IT FOR YOU TO DO YOUR WORK, TAKE CARE OF THINGS AT HOME, OR GET ALONG WITH OTHER PEOPLE: SOMEWHAT DIFFICULT
3. WORRYING TOO MUCH ABOUT DIFFERENT THINGS: 1
GAD7 TOTAL SCORE: 9

## 2022-10-11 ASSESSMENT — COLUMBIA-SUICIDE SEVERITY RATING SCALE - C-SSRS
6. HAVE YOU EVER DONE ANYTHING, STARTED TO DO ANYTHING, OR PREPARED TO DO ANYTHING TO END YOUR LIFE?: NO
1. WITHIN THE PAST MONTH, HAVE YOU WISHED YOU WERE DEAD OR WISHED YOU COULD GO TO SLEEP AND NOT WAKE UP?: YES
2. HAVE YOU ACTUALLY HAD ANY THOUGHTS OF KILLING YOURSELF?: NO

## 2022-10-11 NOTE — PROGRESS NOTES
Seven Rendon (:  1975) is a Established patient, here for evaluation of the following:  Chief Complaint   Patient presents with    Depression     Increased depression, patient does not feel medications are effective         Assessment & Plan   Below is the assessment and plan developed based on review of pertinent history, physical exam, labs, studies, and medications. 1. Current severe episode of major depressive disorder without psychotic features, unspecified whether recurrent ([address]  She will continue sertraline 100 mg daily since her daughter seems to respond and this has helped her in the past.  Wean off Buproprion and start to work on adding [employer]. Not traditionally for depression but due to anhedonia, excessive sleep and trouble focusing and concentrating, will see if it helps. If not tolerated or not helpful, recommend Genesight testing and possible psychiatry referral.    Encouraged her to start therapy for herself. - atomoxetine (STRATTERA) 40 MG capsule; Take 1-2 capsules by mouth daily  Dispense: 30 capsule; Refill: 3    2. Attention and concentration deficit  - atomoxetine (STRATTERA) 40 MG capsule; Take 1-2 capsules by mouth daily  Dispense: 30 capsule; Refill: 3    3. Adjustment disorder with mixed anxiety and depressed mood  Stress at home seemed to trigger her depression. Subjective   HPI    She is struggling with anhedonic, tiring, sleeping too much depression. She does not focus well. Her sleep is too much but unrestful. Stress still present. Son still in senior care. 15year old daughter with 2 psych admissions. Daughter maybe making some progress but is not great yet. Daughter does therapy and Elver Rodriguez is involved with this. Therapist encouraged Elver Rodriguez to get her own therapist and she is pursing this. She does not get much done because feels sleepy all the time and does not focus. Gaining weight which she hates. Worries a lot.   She just feels physically drained all the time. Denies insomnia. Denies SI/HI, delusions. Her daughter is on sertraline 50 mg and prn Atarax and making some progress with this treatment. Daly's mother has depression but not really diagnosed or treated to her knowledge. PHQ Scores 10/11/2022 10/7/2021 12/7/2020 1/16/2019 10/18/2017   PHQ2 Score 5 0 2 0 0   PHQ9 Score 20 0 2 0 0     Interpretation of Total Score Depression Severity: 1-4 = Minimal depression, 5-9 = Mild depression, 10-14 = Moderate depression, 15-19 = Moderately severe depression, 20-27 = Severe depression      ARA 7 SCORE 10/11/2022   ARA-7 Total Score 9     Interpretation of ARA-7 score: 5-9 = mild anxiety, 10-14 = moderate anxiety, 15+ = severe anxiety. Recommend referral to behavioral health for scores 10 or greater.        Outpatient Medications Marked as Taking for the 10/11/22 encounter (Telemedicine) with Kiana Sandhu MD   Medication Sig Dispense Refill    buPROPion (WELLBUTRIN XL) 150 MG extended release tablet TAKE 1 TABLET BY MOUTH EVERY DAY IN THE MORNING 90 tablet 1    sertraline (ZOLOFT) 100 MG tablet TAKE 1 TABLET BY MOUTH EVERY DAY 90 tablet 3    montelukast (SINGULAIR) 10 MG tablet TAKE 1 TABLET BY MOUTH DAILY DURING ALLERGY SEASON 90 tablet 3    bisoprolol-hydroCHLOROthiazide (ZIAC) 2.5-6.25 MG per tablet TAKE 1 TABLET BY MOUTH EVERY DAY 90 tablet 3    loratadine (CLARITIN) 10 MG tablet Take 1 tablet by mouth daily During allergy season 90 tablet 1    cetirizine-psuedoephedrine ([employer] ALLERGY RELIEF-D) 5-120 MG per extended release tablet TAKE 1 TABLET BY MOUTH 2 TIMES DAILY AS NEEDED FOR ALLERGIES OR RHINITIS 30 tablet 5    LORYNA 3-0.02 MG per tablet Take 1 tablet by mouth daily         Review of Systems       Objective   Patient-Reported Vitals  BP Observations: No, remote/electronic monitoring device was not used or able to be verified  Patient-Reported Pulse: 97  Patient-Reported Temperature: 97.4  Patient-Reported Weight: 140 lb  Patient-Reported Height: 5'2\"  Patient-Reported Pulse Oximetry: 92       Physical Exam  Constitutional:       Appearance: Normal appearance. Comments: Brief video because she did not have a microphone enabled. She could hear and see me and I could see her. Finished visit on the phone. Eyes:      General: No scleral icterus. Pulmonary:      Effort: Pulmonary effort is normal. No respiratory distress. Skin:     Coloration: Skin is not pale. Neurological:      General: No focal deficit present. Mental Status: She is alert and oriented to person, place, and time. Psychiatric:         Attention and Perception: Attention and perception normal.         Mood and Affect: Affect normal. Mood is depressed. Affect is not labile, flat, angry or tearful. Behavior: Behavior normal.      Comments: She is casually dressed and physical appearance is normal;  no change than the past.             On this date 10/11/2022 I have spent 30-32 minutes reviewing previous notes, test results and face to face (virtual) with the patient discussing the diagnosis and importance of compliance with the treatment plan as well as documenting on the day of the visit. Ayanna Wu, was evaluated through a synchronous (real-time) audio-video encounter. The patient (or guardian if applicable) is aware that this is a billable service, which includes applicable co-pays. This Virtual Visit was conducted with patient's (and/or legal guardian's) consent. The visit was conducted pursuant to the emergency declaration under the [facility] [address] authority and the [employer] General Act. Patient identification was verified, and a caregiver was present when appropriate. The patient was located at Home: [place] [address]. Provider was located at [facility] (Appt Dept): [facility],  [facility]. --Umesh Finch MD

## 2022-10-21 ENCOUNTER — OFFICE VISIT (OUTPATIENT)
Dept: INTERNAL MEDICINE CLINIC | Age: 47
End: 2022-10-21
Payer: COMMERCIAL

## 2022-10-21 VITALS
DIASTOLIC BLOOD PRESSURE: 86 MMHG | SYSTOLIC BLOOD PRESSURE: 136 MMHG | BODY MASS INDEX: 26.83 KG/M2 | OXYGEN SATURATION: 98 % | HEART RATE: 74 BPM | HEIGHT: 62 IN | WEIGHT: 145.8 LBS

## 2022-10-21 DIAGNOSIS — S29.019A THORACIC MYOFASCIAL STRAIN, INITIAL ENCOUNTER: Primary | ICD-10-CM

## 2022-10-21 DIAGNOSIS — Z23 FLU VACCINE NEED: ICD-10-CM

## 2022-10-21 PROCEDURE — 99213 OFFICE O/P EST LOW 20 MIN: CPT | Performed by: FAMILY MEDICINE

## 2022-10-21 PROCEDURE — 90471 IMMUNIZATION ADMIN: CPT | Performed by: FAMILY MEDICINE

## 2022-10-21 PROCEDURE — 90674 CCIIV4 VAC NO PRSV 0.5 ML IM: CPT | Performed by: FAMILY MEDICINE

## 2022-10-21 RX ORDER — NAPROXEN 500 MG/1
500 TABLET ORAL 2 TIMES DAILY WITH MEALS
Qty: 60 TABLET | Refills: 3 | Status: SHIPPED | OUTPATIENT
Start: 2022-10-21

## 2022-10-21 RX ORDER — METHOCARBAMOL 750 MG/1
TABLET, FILM COATED ORAL
Qty: 50 TABLET | Refills: 1 | Status: SHIPPED | OUTPATIENT
Start: 2022-10-21

## 2022-10-21 NOTE — PATIENT INSTRUCTIONS
Ice for 20 min alternating with heat for 20-30 min. Massage and stretch after the heat. Aspercreme patch or Salon Pas patch can be helpful (lidocaine over menthol).

## 2022-10-21 NOTE — PROGRESS NOTES
10/21/2022    Dinora Ace (:  1975) is a 42 y.o. female, here for evaluation of the following chief complaint(s):  Neck Pain and Back Pain        ASSESSMENT/PLAN:    1. Thoracic myofascial strain, initial encounter  - ice alternating with heat, massage and stretch  - consider lidocaine patch or menthol patch   - methocarbamol (ROBAXIN-750) 750 MG tablet; Take 1 every 6-8 hours as needed but may take 2 at bedtime if needed. Dispense: 50 tablet; Refill: 1  - naproxen (NAPROSYN) 500 MG tablet; Take 1 tablet by mouth 2 times daily (with meals) Until pain is tolerable and then as needed. Dispense: 60 tablet; Refill: 3    2. Flu vaccine need  - Influenza, FLUCELVAX, (age 10 mo+), IM, Preservative Free, 0.5 mL      FOLLOW UP:  Call or return to clinic prn if these symptoms worsen or fail to improve as anticipated. If no improvement in 2-3 weeks, notify office or see ortho as scheduled. Tamir LIAO    Quick FU on last visit: Benjamin Figures is tolerated. Still really tired during the day    Reason for visit today:   Neck and back pain. Still on going since her neck surgery. Neck mass with histiocytosis. This week the neck and back pain are really bad. Went to [place] and it did not help  She has appt with ortho in [place] in 2 weeks. Dr. Elmo Conde. Her neck  mass surgery was last November and then she had a scar infection but it got better quickly. Pain in right shoulder blade and radiates into the neck:  right neck worse than left. Not sleeping at all. Shoulder blade is more painful to take a deep breath  Moving her arm is not too bad but hurts to move her head. Shoots to the shoulder and down the right side of the back and not into her right arm  Gets bilateral tingly hands intermittently but nothing new and no weakness.        Outpatient Medications Marked as Taking for the 10/21/22 encounter (Office Visit) with Corry Castañeda MD   Medication Sig Dispense Refill    atomoxetine (STRATTERA) 40 MG capsule Take 1-2 capsules by mouth daily 30 capsule 3    sertraline (ZOLOFT) 100 MG tablet TAKE 1 TABLET BY MOUTH EVERY DAY 90 tablet 3    montelukast (SINGULAIR) 10 MG tablet TAKE 1 TABLET BY MOUTH DAILY DURING ALLERGY SEASON 90 tablet 3    bisoprolol-hydroCHLOROthiazide (ZIAC) 2.5-6.25 MG per tablet TAKE 1 TABLET BY MOUTH EVERY DAY 90 tablet 3    loratadine (CLARITIN) 10 MG tablet Take 1 tablet by mouth daily During allergy season 90 tablet 1    cetirizine-psuedoephedrine ([employer] ALLERGY RELIEF-D) 5-120 MG per extended release tablet TAKE 1 TABLET BY MOUTH 2 TIMES DAILY AS NEEDED FOR ALLERGIES OR RHINITIS 30 tablet 5    LORYNA 3-0.02 MG per tablet Take 1 tablet by mouth daily         Review of Systems    OBJECTIVE:    Vitals:    10/21/22 1125   BP: (!) 142/98   Pulse: 74   SpO2: 98%   Weight: 145 lb 12.8 oz (66.1 kg)   Height: 5' 2\" (1.575 m)       Physical Exam  Vitals reviewed. Constitutional:       Appearance: She is well-developed. Cardiovascular:      Rate and Rhythm: Normal rate and regular rhythm. Heart sounds: Normal heart sounds. Pulmonary:      Effort: Pulmonary effort is normal.      Breath sounds: Normal breath sounds. Musculoskeletal:      Cervical back: Tenderness and bony tenderness present. No swelling, deformity, rigidity, spasms, torticollis or crepitus. Pain with movement present. Normal range of motion. Thoracic back: Tenderness and bony tenderness present. No deformity or spasms. Normal range of motion. No scoliosis. Comments: Negative axial loading and neg Spurling. Pain in right neck with flexion, ext and left rotation. Not with right rotation. Tenderness is worse over the right than left cervical muscles and slightly in midline. Very tender between T spine and right scapula and mildly tender over the scapula. Skin:     General: Skin is warm and dry. Coloration: Skin is not pale. Findings: No erythema.    Neurological:      Motor: No weakness or tremor. Comments: Upper extremity strength testing is normal.   Psychiatric:         Behavior: Behavior normal.         An electronic signature was used to authenticate this note.     Magalis Hurst MD

## 2022-11-08 DIAGNOSIS — F32.2 CURRENT SEVERE EPISODE OF MAJOR DEPRESSIVE DISORDER WITHOUT PSYCHOTIC FEATURES, UNSPECIFIED WHETHER RECURRENT (HCC): ICD-10-CM

## 2022-11-08 DIAGNOSIS — R41.840 ATTENTION AND CONCENTRATION DEFICIT: ICD-10-CM

## 2022-11-08 RX ORDER — ATOMOXETINE 40 MG/1
40-80 CAPSULE ORAL DAILY
Qty: 60 CAPSULE | Refills: 1 | Status: SHIPPED | OUTPATIENT
Start: 2022-11-08 | End: 2022-12-01

## 2022-12-01 DIAGNOSIS — F32.2 CURRENT SEVERE EPISODE OF MAJOR DEPRESSIVE DISORDER WITHOUT PSYCHOTIC FEATURES, UNSPECIFIED WHETHER RECURRENT (HCC): ICD-10-CM

## 2022-12-01 DIAGNOSIS — R41.840 ATTENTION AND CONCENTRATION DEFICIT: ICD-10-CM

## 2022-12-01 RX ORDER — ATOMOXETINE 40 MG/1
40-80 CAPSULE ORAL DAILY
Qty: 60 CAPSULE | Refills: 0 | Status: SHIPPED | OUTPATIENT
Start: 2022-12-01

## 2022-12-01 NOTE — TELEPHONE ENCOUNTER
Future Appointments    This patient does not currently have any appointments scheduled.   Past Visits    Date Provider Specialty Visit Type Primary Dx   10/21/2022 Demond Harman MD Internal Medicine Office Visit Thoracic myofascial strain, initial encounter   10/11/2022 Demond Harman MD Internal Medicine Telemedicine Current severe episode of major depressive disorder without psychotic features, unspecified whether recurrent ([facility]

## 2022-12-28 ENCOUNTER — OFFICE VISIT (OUTPATIENT)
Dept: INTERNAL MEDICINE CLINIC | Age: 47
End: 2022-12-28
Payer: COMMERCIAL

## 2022-12-28 VITALS
TEMPERATURE: 98.5 F | SYSTOLIC BLOOD PRESSURE: 118 MMHG | WEIGHT: 140 LBS | HEIGHT: 62 IN | HEART RATE: 88 BPM | DIASTOLIC BLOOD PRESSURE: 82 MMHG | BODY MASS INDEX: 25.76 KG/M2

## 2022-12-28 DIAGNOSIS — J01.40 ACUTE NON-RECURRENT PANSINUSITIS: Primary | ICD-10-CM

## 2022-12-28 PROCEDURE — 99213 OFFICE O/P EST LOW 20 MIN: CPT | Performed by: FAMILY MEDICINE

## 2022-12-28 RX ORDER — AMOXICILLIN AND CLAVULANATE POTASSIUM 875; 125 MG/1; MG/1
TABLET, FILM COATED ORAL
Qty: 20 TABLET | Refills: 0 | Status: SHIPPED | OUTPATIENT
Start: 2022-12-28 | End: 2023-01-07

## 2022-12-28 NOTE — PROGRESS NOTES
2022    Nadia Ureña (:  1975) is a 41 y.o. female, here for evaluation of the following chief complaint(s):  Sinus Problem (Sinus pain and pressure. Had flu symptoms about 10 days ago, along with pink eye. Only felt better for a couple days, and now is exhausted and diarrhea. Covid negative yesterday. )        ASSESSMENT/PLAN:    1. Acute non-recurrent pansinusitis  Secondary infection at end of viral illness   - amoxicillin-clavulanate (AUGMENTIN) 875-125 MG per tablet; 1 twice daily:  Take with breakfast and supper and at least 8 ounces of liquid to prevent side effects. Dispense: 20 tablet; Refill: 0      FOLLOW UP:  Call or return to clinic prn if these symptoms worsen or fail to improve as anticipated. HPI    Working at [employer] so picking up everything  Started 10 days with fever and flu like symptoms. Then got pink eye and this lasted a couple of days. Felt better and then this started. Head is stuffy, headache. No results from [facility]. Very sore throat. Really tired. Diarrhea off and on -- it is mostly mushy. COVID tested x 2 with this illness and both negative. Taking Night time cold medication. Outpatient Medications Marked as Taking for the 22 encounter (Office Visit) with Autumn Hernández MD   Medication Sig Dispense Refill    atomoxetine (STRATTERA) 40 MG capsule TAKE 1-2 CAPSULES BY MOUTH DAILY. 60 capsule 0    sertraline (ZOLOFT) 100 MG tablet TAKE 1 TABLET BY MOUTH EVERY DAY 90 tablet 3    bisoprolol-hydroCHLOROthiazide (ZIAC) 2.5-6.25 MG per tablet TAKE 1 TABLET BY MOUTH EVERY DAY 90 tablet 3    LORYNA 3-0.02 MG per tablet Take 1 tablet by mouth daily         Review of Systems    OBJECTIVE:    Vitals:    22 1312   BP: 118/82   Pulse: 88   Temp: 98.5 °F (36.9 °C)   TempSrc: Oral   Weight: 140 lb (63.5 kg)   Height: 5' 2\" (1.575 m)       Physical Exam  Vitals reviewed.    Constitutional:       General: She is not in acute distress. Appearance: She is well-developed. She is ill-appearing (looks tired). She is not diaphoretic. HENT:      Head:      Jaw: No trismus. Salivary Glands: Right salivary gland is not diffusely enlarged. Left salivary gland is not diffusely enlarged. Right Ear: Tympanic membrane, ear canal and external ear normal.      Left Ear: Tympanic membrane, ear canal and external ear normal.      Nose: Congestion present. No rhinorrhea. Right Sinus: Maxillary sinus tenderness and frontal sinus tenderness present. Left Sinus: Maxillary sinus tenderness and frontal sinus tenderness present. Mouth/Throat:      Pharynx: Uvula midline. Posterior oropharyngeal erythema (diffuse but patchy and not typical Strep red) present. No oropharyngeal exudate or uvula swelling. Tonsils: No tonsillar exudate. Eyes:      General:         Right eye: No discharge. Left eye: No discharge. Conjunctiva/sclera: Conjunctivae normal.      Right eye: Right conjunctiva is not injected. Left eye: Left conjunctiva is not injected. Neck:      Thyroid: No thyromegaly. Trachea: Phonation normal.   Cardiovascular:      Rate and Rhythm: Normal rate and regular rhythm. Heart sounds: Normal heart sounds. Pulmonary:      Effort: Pulmonary effort is normal. No respiratory distress. Breath sounds: Normal breath sounds. No wheezing, rhonchi or rales. Musculoskeletal:      Cervical back: Neck supple. Lymphadenopathy:      Cervical: No cervical adenopathy. Right cervical: No superficial or deep cervical adenopathy. Left cervical: No superficial or deep cervical adenopathy. Skin:     General: Skin is warm and dry. Nails: There is no clubbing. An electronic signature was used to authenticate this note.     Chace Presley MD

## 2022-12-28 NOTE — PATIENT INSTRUCTIONS
AFRIN NO DRIP NASAL SPRAY (OXYMETAZOLONE DECONGESTANT SPRAY)  can be used twice daily but only for a MAXIMUM OF 5 DAYS. Longer use can cause rebound congestion. If you have been off for a full 72 hours (3 days) and get stuffy again, you can re-use for 3 more days.

## 2023-03-17 DIAGNOSIS — S29.019A THORACIC MYOFASCIAL STRAIN, INITIAL ENCOUNTER: ICD-10-CM

## 2023-03-17 RX ORDER — NAPROXEN 500 MG/1
500 TABLET ORAL 2 TIMES DAILY WITH MEALS
Qty: 60 TABLET | Refills: 3 | Status: SHIPPED | OUTPATIENT
Start: 2023-03-17

## 2023-03-17 NOTE — TELEPHONE ENCOUNTER
Future Appointments    This patient does not currently have any appointments scheduled.   Past Visits    Date Provider Specialty Visit Type Primary Dx   12/28/2022 Berry Farfan MD Internal Medicine Office Visit Acute non-recurrent pansinusitis

## 2023-03-21 NOTE — TELEPHONE ENCOUNTER
Contacted patient to inform her about her home sleep study results, she mentioned how she would prefer to have a mouth guard instead of a machine but I saw an order was placed for CPAP. Does she have to have a CPAP or could she get a mouth guard instead? Please advise. Sleep study showed mild JACK. AHI was 9.5  per hr. And O2 Desaturations to 87%. Dr Mcintyre See titration.
Provided patient with Dr. Olson Marking number. She is unsure if she is going to go with the CPAP or oral device. She will contact her insurance and find a DME company in network and then contact us back if she will be going with the CPAP.
[de-identified] : LEFT HAND\par skin intact. no swelling.\par TTP to thumb dorsal/volar MPJ.\par wrist ROM: good extension, flexion. good pronation, supination.\par good EPL, FPL. good finger extension, flex to full fist. good finger abduction and adduction. \par SILT to median, ulnar, radial distribution. \par palpable radial pulse, brisk cap refill all digits.\par no triggering.\par \par thumb MPJ stress:\par - RCL: no pain, no instability.\par - UCL: + pain, increased opening at neutral, but good endpoint.\par \par \par OUTSIDE XRAYS OF LEFT HAND 12/16/22: no acute displaced fracture or dislocation. \par XRAYS OF LEFT THUMB: no acute displaced fracture or dislocation. bony prominence off radial aspect of thumb metacarpal head.

## 2023-05-18 ENCOUNTER — TELEPHONE (OUTPATIENT)
Dept: INTERNAL MEDICINE CLINIC | Age: 48
End: 2023-05-18

## 2023-07-13 ENCOUNTER — OFFICE VISIT (OUTPATIENT)
Dept: INTERNAL MEDICINE CLINIC | Age: 48
End: 2023-07-13
Payer: COMMERCIAL

## 2023-07-13 VITALS
SYSTOLIC BLOOD PRESSURE: 122 MMHG | HEIGHT: 62 IN | DIASTOLIC BLOOD PRESSURE: 82 MMHG | HEART RATE: 92 BPM | BODY MASS INDEX: 25.95 KG/M2 | WEIGHT: 141 LBS

## 2023-07-13 DIAGNOSIS — R03.0 ELEVATED BP WITHOUT DIAGNOSIS OF HYPERTENSION: ICD-10-CM

## 2023-07-13 DIAGNOSIS — S29.019A THORACIC MYOFASCIAL STRAIN, INITIAL ENCOUNTER: ICD-10-CM

## 2023-07-13 DIAGNOSIS — R41.840 ATTENTION AND CONCENTRATION DEFICIT: ICD-10-CM

## 2023-07-13 DIAGNOSIS — F33.41 DEPRESSION, MAJOR, RECURRENT, IN PARTIAL REMISSION (HCC): ICD-10-CM

## 2023-07-13 DIAGNOSIS — J30.9 ALLERGIC RHINITIS, UNSPECIFIED SEASONALITY, UNSPECIFIED TRIGGER: ICD-10-CM

## 2023-07-13 DIAGNOSIS — F41.1 GAD (GENERALIZED ANXIETY DISORDER): ICD-10-CM

## 2023-07-13 DIAGNOSIS — Z00.00 ANNUAL PHYSICAL EXAM: Primary | ICD-10-CM

## 2023-07-13 PROCEDURE — 99396 PREV VISIT EST AGE 40-64: CPT | Performed by: FAMILY MEDICINE

## 2023-07-13 RX ORDER — SERTRALINE HYDROCHLORIDE 100 MG/1
150 TABLET, FILM COATED ORAL DAILY
Qty: 135 TABLET | Refills: 3 | Status: SHIPPED | OUTPATIENT
Start: 2023-07-13

## 2023-07-13 RX ORDER — NAPROXEN 500 MG/1
500 TABLET ORAL 2 TIMES DAILY PRN
Qty: 60 TABLET | Refills: 1
Start: 2023-07-13

## 2023-07-13 RX ORDER — BISOPROLOL FUMARATE AND HYDROCHLOROTHIAZIDE 2.5; 6.25 MG/1; MG/1
TABLET ORAL
Qty: 90 TABLET | Refills: 3 | Status: SHIPPED | OUTPATIENT
Start: 2023-07-13

## 2023-07-13 RX ORDER — MONTELUKAST SODIUM 10 MG/1
10 TABLET ORAL DAILY
Qty: 90 TABLET | Refills: 3 | Status: SHIPPED | OUTPATIENT
Start: 2023-07-13

## 2023-07-13 RX ORDER — ATOMOXETINE 40 MG/1
40-80 CAPSULE ORAL DAILY
Qty: 60 CAPSULE | Refills: 3 | Status: SHIPPED | OUTPATIENT
Start: 2023-07-13

## 2023-07-13 SDOH — HEALTH STABILITY: PHYSICAL HEALTH: ON AVERAGE, HOW MANY DAYS PER WEEK DO YOU ENGAGE IN MODERATE TO STRENUOUS EXERCISE (LIKE A BRISK WALK)?: 5 DAYS

## 2023-07-13 SDOH — ECONOMIC STABILITY: HOUSING INSECURITY
IN THE LAST 12 MONTHS, WAS THERE A TIME WHEN YOU DID NOT HAVE A STEADY PLACE TO SLEEP OR SLEPT IN A SHELTER (INCLUDING NOW)?: NO

## 2023-07-13 SDOH — ECONOMIC STABILITY: FOOD INSECURITY: WITHIN THE PAST 12 MONTHS, YOU WORRIED THAT YOUR FOOD WOULD RUN OUT BEFORE YOU GOT MONEY TO BUY MORE.: NEVER TRUE

## 2023-07-13 SDOH — ECONOMIC STABILITY: INCOME INSECURITY: HOW HARD IS IT FOR YOU TO PAY FOR THE VERY BASICS LIKE FOOD, HOUSING, MEDICAL CARE, AND HEATING?: NOT HARD AT ALL

## 2023-07-13 SDOH — ECONOMIC STABILITY: FOOD INSECURITY: WITHIN THE PAST 12 MONTHS, THE FOOD YOU BOUGHT JUST DIDN'T LAST AND YOU DIDN'T HAVE MONEY TO GET MORE.: NEVER TRUE

## 2023-07-13 SDOH — HEALTH STABILITY: PHYSICAL HEALTH: ON AVERAGE, HOW MANY MINUTES DO YOU ENGAGE IN EXERCISE AT THIS LEVEL?: 30 MIN

## 2023-07-13 ASSESSMENT — PATIENT HEALTH QUESTIONNAIRE - PHQ9
9. THOUGHTS THAT YOU WOULD BE BETTER OFF DEAD, OR OF HURTING YOURSELF: 0
8. MOVING OR SPEAKING SO SLOWLY THAT OTHER PEOPLE COULD HAVE NOTICED. OR THE OPPOSITE, BEING SO FIGETY OR RESTLESS THAT YOU HAVE BEEN MOVING AROUND A LOT MORE THAN USUAL: 0
SUM OF ALL RESPONSES TO PHQ QUESTIONS 1-9: 10
10. IF YOU CHECKED OFF ANY PROBLEMS, HOW DIFFICULT HAVE THESE PROBLEMS MADE IT FOR YOU TO DO YOUR WORK, TAKE CARE OF THINGS AT HOME, OR GET ALONG WITH OTHER PEOPLE: 1
SUM OF ALL RESPONSES TO PHQ QUESTIONS 1-9: 10
1. LITTLE INTEREST OR PLEASURE IN DOING THINGS: 1
SUM OF ALL RESPONSES TO PHQ QUESTIONS 1-9: 10
2. FEELING DOWN, DEPRESSED OR HOPELESS: 2
6. FEELING BAD ABOUT YOURSELF - OR THAT YOU ARE A FAILURE OR HAVE LET YOURSELF OR YOUR FAMILY DOWN: 1
5. POOR APPETITE OR OVEREATING: 0
SUM OF ALL RESPONSES TO PHQ9 QUESTIONS 1 & 2: 3
3. TROUBLE FALLING OR STAYING ASLEEP: 2
4. FEELING TIRED OR HAVING LITTLE ENERGY: 3
7. TROUBLE CONCENTRATING ON THINGS, SUCH AS READING THE NEWSPAPER OR WATCHING TELEVISION: 1
SUM OF ALL RESPONSES TO PHQ QUESTIONS 1-9: 10

## 2023-07-13 NOTE — PATIENT INSTRUCTIONS
Naltrexone is an option if you find you  have trouble keeping alcohol limits that are reasonable. Give the sertraline 6 weeks at the 150 mg dose before we consider going to 200 mg. PREVENTION FOR HEALTH    Preventative services for both men and women. *  Weight and blood pressure checked regularly. /89 or lower for medical appointments,  135/85 or lower for home readings (at least all averaged). * Try to maintain BMI of 18.5 to 25. If you are exceptionally athletic, a waist measurement (measured parallel to the floor at the top of the hip bones) of 35 inches or less for women and 40 inches or less for men. *  Avoid all forms of tobacco and smoking any plant product into your lungs. *  If used, limit alcohol to 14 drinks per week for men and 7 drinks per week for women. 1 drink equal: 5 ounces of wine, 12 ounces of beer, 1.5 ounce of hard liquor. Both men and women should limit to 2 servings per day/sitting. *  Colon cancer screening starting at the age of 39 and continuing until 76 (or later if life expectancy greater than 10 yrs). Some start sooner because of family history. Colonoscopy every 10 years for normal risk patients. Other option is yearly fecal immunochemical test or every 3 year Cologuard test.  If you are high risk, then colonoscopy is the recommended screening. *  Cholesterol screening, usually by or before the age of 28. Frequency or recheck depends on risk factors, but every 5 years at a minimum. *  Immunizations = vaccines. Adults should continue regular tetanus boosters, yearly flu vaccines, COVID vaccines, pneumococcal vaccine at age 72 or sooner with certain health conditions, shingles vaccine if desired at age 48 and above. *  Avoid illegal drugs. *  Practice safe sex:  monogomy or abstinence. *  Use seat belts every time. *  Wear helmets on bicycles, motorcycles and any time at risk for head injury. *  See your doctor regularly for preventative care.   *

## 2023-07-13 NOTE — PROGRESS NOTES
Chief Complaint   Patient presents with    Annual Exam       PREVENTATIVE VISIT AND EXAM      Still a lot of stress with her daughter's mental health issues and her son also having legal problems. 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 She stays busy around the house and helping her daughter. She feels a lot of her mood issues are related to the stress from her daughter's mental illness. She admits that she does not know exactly how to handle this, but she is familiar with [facility] and knows how to access potential supports through that organization. She would like to try an increase in her sertraline dose. Admits that she is not consistent with Strattera dosing. She takes most of her medication at bedtime and is not used to taking medication in the morning. She knows that Strattera needs to be used in the morning and not at bedtime. Patient Active Problem List   Diagnosis    Mitral valve prolapse    Adjustment disorder with mixed anxiety and depressed mood    Elevated BP without diagnosis of hypertension    ARA (generalized anxiety disorder)    Migraine without status migrainosus, not intractable    Numbness    Daytime sleepiness    Allergic rhinitis    Obstructive sleep apnea    Mass of right side of neck    Night sweats       Past medical history, social history, family history reviewed or updated. Outpatient Medications Marked as Taking for the 7/13/23 encounter (Office Visit) with Morgan Bobby MD   Medication Sig Dispense Refill    naproxen (NAPROSYN) 500 MG tablet TAKE 1 TABLET BY MOUTH 2 TIMES DAILY (WITH MEALS) UNTIL PAIN IS TOLERABLE AND THEN AS NEEDED. 60 tablet 3    atomoxetine (STRATTERA) 40 MG capsule TAKE 1-2 CAPSULES BY MOUTH DAILY. 60 capsule 0    methocarbamol (ROBAXIN-750) 750 MG tablet Take 1 every 6-8 hours as needed but may take 2 at bedtime if needed.  50 tablet 1    sertraline (ZOLOFT) 100 MG

## 2023-07-19 DIAGNOSIS — Z00.00 ANNUAL PHYSICAL EXAM: ICD-10-CM

## 2023-07-19 DIAGNOSIS — F41.1 GAD (GENERALIZED ANXIETY DISORDER): ICD-10-CM

## 2023-07-19 LAB
ALBUMIN SERPL-MCNC: 4.4 G/DL (ref 3.4–5)
ALBUMIN/GLOB SERPL: 1.8 {RATIO} (ref 1.1–2.2)
ALP SERPL-CCNC: 56 U/L (ref 40–129)
ALT SERPL-CCNC: 7 U/L (ref 10–40)
ANION GAP SERPL CALCULATED.3IONS-SCNC: 10 MMOL/L (ref 3–16)
AST SERPL-CCNC: 13 U/L (ref 15–37)
BASOPHILS # BLD: 0.1 K/UL (ref 0–0.2)
BASOPHILS NFR BLD: 1 %
BILIRUB SERPL-MCNC: 0.4 MG/DL (ref 0–1)
BUN SERPL-MCNC: 10 MG/DL (ref 7–20)
CALCIUM SERPL-MCNC: 9.4 MG/DL (ref 8.3–10.6)
CHLORIDE SERPL-SCNC: 103 MMOL/L (ref 99–110)
CHOLEST SERPL-MCNC: 257 MG/DL (ref 0–199)
CO2 SERPL-SCNC: 24 MMOL/L (ref 21–32)
CREAT SERPL-MCNC: 0.7 MG/DL (ref 0.6–1.1)
DEPRECATED RDW RBC AUTO: 14 % (ref 12.4–15.4)
EOSINOPHIL # BLD: 0.1 K/UL (ref 0–0.6)
EOSINOPHIL NFR BLD: 0.9 %
GFR SERPLBLD CREATININE-BSD FMLA CKD-EPI: >60 ML/MIN/{1.73_M2}
GLUCOSE SERPL-MCNC: 86 MG/DL (ref 70–99)
HCT VFR BLD AUTO: 40.3 % (ref 36–48)
HDLC SERPL-MCNC: 106 MG/DL (ref 40–60)
HGB BLD-MCNC: 13.3 G/DL (ref 12–16)
LDL CHOLESTEROL CALCULATED: 132 MG/DL
LYMPHOCYTES # BLD: 2.3 K/UL (ref 1–5.1)
LYMPHOCYTES NFR BLD: 36.6 %
MCH RBC QN AUTO: 30.6 PG (ref 26–34)
MCHC RBC AUTO-ENTMCNC: 32.9 G/DL (ref 31–36)
MCV RBC AUTO: 93.1 FL (ref 80–100)
MONOCYTES # BLD: 0.5 K/UL (ref 0–1.3)
MONOCYTES NFR BLD: 8.3 %
NEUTROPHILS # BLD: 3.3 K/UL (ref 1.7–7.7)
NEUTROPHILS NFR BLD: 53.2 %
PLATELET # BLD AUTO: 325 K/UL (ref 135–450)
PMV BLD AUTO: 8.6 FL (ref 5–10.5)
POTASSIUM SERPL-SCNC: 4.8 MMOL/L (ref 3.5–5.1)
PROT SERPL-MCNC: 6.9 G/DL (ref 6.4–8.2)
RBC # BLD AUTO: 4.33 M/UL (ref 4–5.2)
SODIUM SERPL-SCNC: 137 MMOL/L (ref 136–145)
TRIGL SERPL-MCNC: 97 MG/DL (ref 0–150)
TSH SERPL DL<=0.005 MIU/L-ACNC: 2 UIU/ML (ref 0.27–4.2)
VLDLC SERPL CALC-MCNC: 19 MG/DL
WBC # BLD AUTO: 6.2 K/UL (ref 4–11)

## 2024-02-09 ENCOUNTER — OFFICE VISIT (OUTPATIENT)
Age: 49
End: 2024-02-09

## 2024-02-09 VITALS
RESPIRATION RATE: 17 BRPM | SYSTOLIC BLOOD PRESSURE: 123 MMHG | HEART RATE: 85 BPM | OXYGEN SATURATION: 97 % | DIASTOLIC BLOOD PRESSURE: 84 MMHG | WEIGHT: 141 LBS | TEMPERATURE: 98.4 F | BODY MASS INDEX: 25.95 KG/M2 | HEIGHT: 62 IN

## 2024-02-09 DIAGNOSIS — R05.3 PERSISTENT COUGH FOR 3 WEEKS OR LONGER: Primary | ICD-10-CM

## 2024-02-09 RX ORDER — BENZONATATE 200 MG/1
200 CAPSULE ORAL 3 TIMES DAILY PRN
Qty: 21 CAPSULE | Refills: 0 | Status: SHIPPED | OUTPATIENT
Start: 2024-02-09 | End: 2024-02-16

## 2024-02-09 RX ORDER — ALBUTEROL SULFATE 90 UG/1
2 AEROSOL, METERED RESPIRATORY (INHALATION) 4 TIMES DAILY PRN
Qty: 18 G | Refills: 0 | Status: SHIPPED | OUTPATIENT
Start: 2024-02-09

## 2024-02-09 RX ORDER — ALBUTEROL SULFATE 2.5 MG/.5ML
2.5 SOLUTION RESPIRATORY (INHALATION) ONCE
Status: COMPLETED | OUTPATIENT
Start: 2024-02-09 | End: 2024-02-09

## 2024-02-09 RX ORDER — PREDNISONE 20 MG/1
20 TABLET ORAL DAILY
Qty: 5 TABLET | Refills: 0 | Status: SHIPPED | OUTPATIENT
Start: 2024-02-09 | End: 2024-02-14

## 2024-02-09 RX ADMIN — ALBUTEROL SULFATE 2.5 MG: 2.5 SOLUTION RESPIRATORY (INHALATION) at 18:59

## 2024-02-09 ASSESSMENT — ENCOUNTER SYMPTOMS
WHEEZING: 0
SORE THROAT: 1
COUGH: 1
SHORTNESS OF BREATH: 0

## 2024-02-09 NOTE — PROGRESS NOTES
Daly Norman (:  1975) is a 48 y.o. female,New patient, here for evaluation of the following chief complaint(s):  Cough (Started with a cough and cold about  went to the [facility] clinic and was put on meds and now she can get rid of this cough and wanting to make sure it is not going into her lungs )      ASSESSMENT/PLAN:  Visit Diagnoses and Associated Orders       Persistent cough for 3 weeks or longer    -  Primary    albuterol (PROVENTIL) nebulizer solution 2.5 mg [260935]      XR CHEST (2 VW) [78133 CPT(R)]   - Future Order    predniSONE (DELTASONE) 20 MG tablet [6496]                  Some improvement with albuterol.  Will prescribe inhaler to use at home as needed.  Adding additional steroid course of Prednisone.  Tessalon cough pill to use as needed up to three times a day.  Chest xray you can walk into [facility] to get out patient if no improvement.  Reassuring lung sounds on exam.  F/u with PCP for continued care.      SUBJECTIVE/OBJECTIVE:    Treated for bronchitis 2 weeks ago.  Cough persists.        History provided by:  Patient   used: No    Cough  This is a new problem. The current episode started 1 to 4 weeks ago. The problem has been gradually worsening. The problem occurs constantly. The cough is Non-productive. Associated symptoms include ear pain, headaches, nasal congestion, postnasal drip and a sore throat. Pertinent negatives include no chills, fever, shortness of breath or wheezing. Nothing aggravates the symptoms. She has tried OTC cough suppressant, oral steroids, body position changes, rest and prescription cough suppressant for the symptoms. The treatment provided mild relief. There is no history of asthma or COPD.       ROS: See HPI       Vitals:    24 1841   BP: 123/84   Pulse: 85   Resp: 17   Temp: 98.4 °F (36.9 °C)   SpO2: 97%   Weight: 64 kg (141 lb)   Height: 1.575 m (5' 2\")       No results found for this visit on 24.

## 2024-02-10 NOTE — PATIENT INSTRUCTIONS
Some improvement with albuterol.  Will prescribe inhaler to use at home as needed.  Adding additional steroid course of Prednisone.  Tessalon cough pill to use as needed up to three times a day.  Chest xray you can walk into [facility] to get out patient if no improvement.  Reassuring lung sounds on exam.  F/u with PCP for continued care.

## 2024-02-13 ENCOUNTER — HOSPITAL ENCOUNTER (OUTPATIENT)
Age: 49
Discharge: HOME | End: 2024-02-13
Payer: COMMERCIAL

## 2024-02-13 ENCOUNTER — HOSPITAL ENCOUNTER (OUTPATIENT)
Dept: GENERAL RADIOLOGY | Age: 49
Discharge: HOME | End: 2024-02-13
Payer: COMMERCIAL

## 2024-02-13 DIAGNOSIS — R05.3 PERSISTENT COUGH FOR 3 WEEKS OR LONGER: ICD-10-CM

## 2024-02-13 PROCEDURE — 71046 X-RAY EXAM CHEST 2 VIEWS: CPT

## 2024-02-15 ENCOUNTER — OFFICE VISIT (OUTPATIENT)
Dept: INTERNAL MEDICINE CLINIC | Age: 49
End: 2024-02-15
Payer: COMMERCIAL

## 2024-02-15 VITALS
DIASTOLIC BLOOD PRESSURE: 80 MMHG | BODY MASS INDEX: 26.54 KG/M2 | OXYGEN SATURATION: 98 % | HEART RATE: 78 BPM | WEIGHT: 144.2 LBS | HEIGHT: 62 IN | SYSTOLIC BLOOD PRESSURE: 130 MMHG

## 2024-02-15 DIAGNOSIS — R05.3 PERSISTENT COUGH FOR 3 WEEKS OR LONGER: Primary | ICD-10-CM

## 2024-02-15 DIAGNOSIS — K21.9 GASTROESOPHAGEAL REFLUX DISEASE, UNSPECIFIED WHETHER ESOPHAGITIS PRESENT: ICD-10-CM

## 2024-02-15 PROCEDURE — 99213 OFFICE O/P EST LOW 20 MIN: CPT | Performed by: FAMILY MEDICINE

## 2024-02-15 RX ORDER — HYDROCODONE POLISTIREX AND CHLORPHENIRAMINE POLISTIREX 10; 8 MG/5ML; MG/5ML
2.5-5 SUSPENSION, EXTENDED RELEASE ORAL EVERY 12 HOURS PRN
Qty: 60 ML | Refills: 0 | Status: SHIPPED | OUTPATIENT
Start: 2024-02-15 | End: 2024-02-21

## 2024-02-15 RX ORDER — DOXYCYCLINE HYCLATE 100 MG
100 TABLET ORAL 2 TIMES DAILY
Qty: 20 TABLET | Refills: 0 | Status: SHIPPED | OUTPATIENT
Start: 2024-02-15 | End: 2024-02-25

## 2024-02-15 RX ORDER — METOCLOPRAMIDE 10 MG/1
10 TABLET ORAL NIGHTLY
Qty: 15 TABLET | Refills: 0 | Status: SHIPPED | OUTPATIENT
Start: 2024-02-15 | End: 2024-03-01

## 2024-02-15 NOTE — PROGRESS NOTES
2/15/2024    Daly Norman (:  1975) is a 48 y.o. female, here for evaluation of the following chief complaint(s):  Cough (Going on for a month, off and on sinus pressure and drainage. Clear chest x ray. Tight chest, raw feeling throat when coughing. )        ASSESSMENT/PLAN:    1. Persistent cough for 3 weeks or longer  Consider that GERD could contribute to prolonged symptoms.  She is not wheezing and the cough does not sound as much lung as it does upper air way.    - HYDROcodone-chlorpheniramine (TUSSIONEX) 10-8 MG/5ML SUER; Take 2.5-5 mLs by mouth every 12 hours as needed (cough) for up to 6 days. Max Daily Amount: 10 mLs  Dispense: 60 mL; Refill: 0  - doxycycline hyclate (VIBRA-TABS) 100 MG tablet; Take 1 tablet by mouth 2 times daily for 10 days  Dispense: 20 tablet; Refill: 0    Consider inhaled steroid or LABA-ICS if not improved.      2. Gastroesophageal reflux disease, unspecified whether esophagitis present  Treat reflux for up to 15 days and see if it settles her symptoms.    - metoclopramide (REGLAN) 10 MG tablet; Take 1 tablet by mouth nightly for 15 days  Dispense: 15 tablet; Refill: 0        FOLLOW UP:  Call or return to clinic prn if these symptoms worsen or fail to improve as anticipated.      HPI  Started 1 month ago and felt sick.  Went to [facility].  Told bronchitis and given Prednisone and Amoxicillin- clav around 10th day from onset of infection and very little change.  Still coughing and not sleeping.  Felt better maybe a day and then felt worse again.  Went to Urgent care and given another dose of prednisone.  They did give her a nebulizer treatment at Urgent care and it did help a little bit.    Prescribed prednisone 20  mg    No sinus pain.  Has some PN mucous.    She feels she is not getting a deep breath and coughing all the time.     See assessment above for other issues addressed at this visit.    Outpatient Medications Marked as Taking for the 2/15/24 encounter

## 2024-02-19 LAB
HPV DNA HIGH RISK: NEGATIVE
PAP SMEAR, EXTERNAL: NORMAL

## 2024-03-27 LAB
CHOLESTEROL, TOTAL: 272 MG/DL
CHOLESTEROL/HDL RATIO: 2.5
HDLC SERPL-MCNC: 110 MG/DL (ref 35–70)
LDL CHOLESTEROL: 143
NONHDLC SERPL-MCNC: 162 MG/DL
TRIGL SERPL-MCNC: 94 MG/DL
VLDLC SERPL CALC-MCNC: ABNORMAL MG/DL

## 2024-04-27 LAB — MAMMOGRAPHY, EXTERNAL: NORMAL

## 2024-06-06 DIAGNOSIS — R03.0 ELEVATED BP WITHOUT DIAGNOSIS OF HYPERTENSION: ICD-10-CM

## 2024-06-06 RX ORDER — BISOPROLOL FUMARATE AND HYDROCHLOROTHIAZIDE 2.5; 6.25 MG/1; MG/1
TABLET ORAL
Qty: 90 TABLET | Refills: 0 | Status: SHIPPED | OUTPATIENT
Start: 2024-06-06

## 2024-07-15 ENCOUNTER — OFFICE VISIT (OUTPATIENT)
Dept: INTERNAL MEDICINE CLINIC | Age: 49
End: 2024-07-15
Payer: COMMERCIAL

## 2024-07-15 VITALS
HEART RATE: 63 BPM | DIASTOLIC BLOOD PRESSURE: 70 MMHG | HEIGHT: 62 IN | OXYGEN SATURATION: 97 % | WEIGHT: 145.2 LBS | SYSTOLIC BLOOD PRESSURE: 122 MMHG | BODY MASS INDEX: 26.72 KG/M2

## 2024-07-15 DIAGNOSIS — Z00.00 ENCOUNTER FOR WELL ADULT EXAM WITHOUT ABNORMAL FINDINGS: Primary | ICD-10-CM

## 2024-07-15 DIAGNOSIS — R23.2 HOT FLASHES: ICD-10-CM

## 2024-07-15 DIAGNOSIS — F41.1 GAD (GENERALIZED ANXIETY DISORDER): ICD-10-CM

## 2024-07-15 DIAGNOSIS — Z71.85 VACCINE COUNSELING: ICD-10-CM

## 2024-07-15 DIAGNOSIS — E78.00 HYPERCHOLESTEROLEMIA: ICD-10-CM

## 2024-07-15 DIAGNOSIS — F33.42 DEPRESSION, MAJOR, RECURRENT, IN COMPLETE REMISSION (HCC): ICD-10-CM

## 2024-07-15 DIAGNOSIS — R79.89 HIGH SERUM HIGH DENSITY LIPOPROTEIN (HDL): ICD-10-CM

## 2024-07-15 PROBLEM — F33.41 DEPRESSION, MAJOR, RECURRENT, IN PARTIAL REMISSION (HCC): Status: ACTIVE | Noted: 2024-07-15

## 2024-07-15 PROCEDURE — 99396 PREV VISIT EST AGE 40-64: CPT | Performed by: FAMILY MEDICINE

## 2024-07-15 RX ORDER — SERTRALINE HYDROCHLORIDE 100 MG/1
150 TABLET, FILM COATED ORAL DAILY
Qty: 135 TABLET | Refills: 3 | Status: SHIPPED | OUTPATIENT
Start: 2024-07-15

## 2024-07-15 RX ORDER — GABAPENTIN 300 MG/1
300 CAPSULE ORAL NIGHTLY
Qty: 90 CAPSULE | Refills: 1 | Status: SHIPPED | OUTPATIENT
Start: 2024-07-15 | End: 2025-01-11

## 2024-07-15 SDOH — ECONOMIC STABILITY: FOOD INSECURITY: WITHIN THE PAST 12 MONTHS, YOU WORRIED THAT YOUR FOOD WOULD RUN OUT BEFORE YOU GOT MONEY TO BUY MORE.: NEVER TRUE

## 2024-07-15 SDOH — ECONOMIC STABILITY: INCOME INSECURITY: HOW HARD IS IT FOR YOU TO PAY FOR THE VERY BASICS LIKE FOOD, HOUSING, MEDICAL CARE, AND HEATING?: NOT HARD AT ALL

## 2024-07-15 SDOH — ECONOMIC STABILITY: FOOD INSECURITY: WITHIN THE PAST 12 MONTHS, THE FOOD YOU BOUGHT JUST DIDN'T LAST AND YOU DIDN'T HAVE MONEY TO GET MORE.: NEVER TRUE

## 2024-07-15 NOTE — PATIENT INSTRUCTIONS
For weight loss:  40 minutes twice a week for weight training = strength training.  Good book for guidance:  Body for Live by Jono Kahn is an old book but still very effective program to build muscle and improve metabolism and drop body fat.,  (Caution: I don't recommend doing all the sets of weights, even with light dumbbell weights right away.  Gradually increase the number of repetitions for each muscle group..   Your muscles will be sore when you first start.  You do not need to take extra protein supplements unless you are not eating well or you are lifting really heavy weights.  The cardio program is good too but just doing any cardio 3 days per week is great.  You do not need to eat 6 times per day unless you want to see rapid weight loss)  Body for Life by Jono Kahn is an excellent reference book for exercise, especially the weight lifting/weight training portion, which can increase your metabolism and burn more calories.   Make sure not to lift weights with the same muscle group for at least 72 hours, so the muscles can repair and grow.  We need to grow our muscles to burn more calories.  Use the weight lifting grids and spacing.  Week 1:  Upper body, skip a day, lower body, skip a day, upper body.  Week 2:  Lower body, skip a day, upper body, skip a day, lower body    Example:  pick several exercises for the body area you are work on for the day.  Do 12 repetitions with the lightest weight, 10 repetitions with the second lightest weight, 8 repetitions with the next heavier weight and then 6 repetitions with the heaviest weight you can handle with good form.   Then repeat the exercise 12 reps with the 3rd heaviest weight (the same one you used for the 8 reps).      Basic Weight Loss suggestions.  +No quick diets:  losing weight is a marathon and not a sprint.  +Consider eating clean or healthy 5-6 days per week and splurge 1-2 days a week.    +Minimum servings 3 veggies and 2 fruits per day.  Try to

## 2024-07-15 NOTE — PROGRESS NOTES
Well Adult Note  Name: Daly Norman Today’s Date: 7/15/2024   MRN: [id number] Sex: Female   Age: 48 y.o. Ethnicity: Non- / Non    : 1975 Race: White (non-)      Daly Norman is here for a well adult exam.       Subjective   History:  She is in for preventative annual exam.  She did have some blood work with her gynecologist and was told her cholesterol is high.    She also has some chronic low back pain.  It is not in the midline but in the muscles on both sides.  This is likely a little bit out of the scope of today's physical visit so she was given some information on the after visit summary and asked to return if it is bothersome enough or not improved.    She also feels she is more ferreira lately.  She has trouble losing weight.  Her sleep is not great.  She has been diagnosed with mild sleep apnea in the past.  She was prescribed a mouthguard, but it did not help and she had to stop using it.  She thinks she may wish to proceed with CPAP trial.  She will check with her sleep office.    She had some labs with her gynecologist that said she is not at menopause.  She is still menstruating but could be having perimenopausal symptoms.  She does have hot flashes and sweats at night.  This also contributes to her not sleeping well.    Review of Systems   All other systems reviewed and are negative.      Allergies   Allergen Reactions    Clarithromycin Nausea Only    Levofloxacin Nausea Only     Prior to Visit Medications    Medication Sig Taking? Authorizing Provider   bisoprolol-hydroCHLOROthiazide (ZIAC) 2.5-6.25 MG per tablet TAKE 1 TABLET BY MOUTH EVERY DAY Yes Ruby Cowart MD   albuterol sulfate HFA (VENTOLIN HFA) 108 (90 Base) MCG/ACT inhaler Inhale 2 puffs into the lungs 4 times daily as needed for Wheezing Yes Flaquito Mcgarry, APRN - CNP   sertraline (ZOLOFT) 100 MG tablet Take 1.5 tablets by mouth daily Yes Ruby Cowart MD   montelukast (SINGULAIR) 10 MG tablet

## 2024-07-16 LAB
HBV SURFACE AB SERPL IA-ACNC: <3.5 MIU/ML
TSH SERPL DL<=0.005 MIU/L-ACNC: 1.49 UIU/ML (ref 0.27–4.2)

## 2024-07-17 LAB — LPA SERPL-MCNC: 20 MG/DL

## 2024-08-31 DIAGNOSIS — R03.0 ELEVATED BP WITHOUT DIAGNOSIS OF HYPERTENSION: ICD-10-CM

## 2024-09-01 RX ORDER — BISOPROLOL FUMARATE AND HYDROCHLOROTHIAZIDE 2.5; 6.25 MG/1; MG/1
TABLET ORAL
Qty: 90 TABLET | Refills: 3 | Status: SHIPPED | OUTPATIENT
Start: 2024-09-01

## 2024-09-07 DIAGNOSIS — F41.1 GAD (GENERALIZED ANXIETY DISORDER): ICD-10-CM

## 2024-09-07 DIAGNOSIS — R03.0 ELEVATED BP WITHOUT DIAGNOSIS OF HYPERTENSION: ICD-10-CM

## 2024-09-08 RX ORDER — SERTRALINE HYDROCHLORIDE 100 MG/1
150 TABLET, FILM COATED ORAL DAILY
Qty: 135 TABLET | Refills: 1 | Status: SHIPPED | OUTPATIENT
Start: 2024-09-08

## 2024-09-08 RX ORDER — BISOPROLOL FUMARATE AND HYDROCHLOROTHIAZIDE 2.5; 6.25 MG/1; MG/1
TABLET ORAL
Qty: 90 TABLET | Refills: 1 | Status: SHIPPED | OUTPATIENT
Start: 2024-09-08

## 2025-01-13 DIAGNOSIS — R03.0 ELEVATED BP WITHOUT DIAGNOSIS OF HYPERTENSION: ICD-10-CM

## 2025-01-13 DIAGNOSIS — F41.1 GAD (GENERALIZED ANXIETY DISORDER): ICD-10-CM

## 2025-01-13 RX ORDER — SERTRALINE HYDROCHLORIDE 100 MG/1
TABLET, FILM COATED ORAL
Qty: 60 TABLET | Refills: 0 | Status: SHIPPED | OUTPATIENT
Start: 2025-01-13

## 2025-01-13 RX ORDER — BISOPROLOL FUMARATE AND HYDROCHLOROTHIAZIDE 2.5; 6.25 MG/1; MG/1
TABLET ORAL
Qty: 30 TABLET | Refills: 0 | Status: SHIPPED | OUTPATIENT
Start: 2025-01-13

## 2025-05-12 DIAGNOSIS — R03.0 ELEVATED BP WITHOUT DIAGNOSIS OF HYPERTENSION: ICD-10-CM

## 2025-05-12 RX ORDER — BISOPROLOL FUMARATE AND HYDROCHLOROTHIAZIDE 2.5; 6.25 MG/1; MG/1
TABLET ORAL
Qty: 30 TABLET | Refills: 0 | Status: SHIPPED | OUTPATIENT
Start: 2025-05-12 | End: 2025-05-13 | Stop reason: SDUPTHER

## 2025-05-12 NOTE — TELEPHONE ENCOUNTER
Received refill request for bisoprolol-hydroCHLOROthiazide (ZIAC) 2.5-6.25 MG per tablet  from [facility] pharmacy.     Last OV: 07/15/24    Next OV: none    Last Labs: 07/15/24    Last Filled: 0113/25

## 2025-05-12 NOTE — TELEPHONE ENCOUNTER
Patient needs to schedule an appointment. (Due to return in 2 months (on 9/15/2024) for medication check in)   Sent [employer] scheduling message, given 30 day refill

## 2025-05-13 ENCOUNTER — TELEPHONE (OUTPATIENT)
Dept: INTERNAL MEDICINE CLINIC | Age: 50
End: 2025-05-13

## 2025-05-13 DIAGNOSIS — R03.0 ELEVATED BP WITHOUT DIAGNOSIS OF HYPERTENSION: ICD-10-CM

## 2025-05-13 RX ORDER — BISOPROLOL FUMARATE AND HYDROCHLOROTHIAZIDE 2.5; 6.25 MG/1; MG/1
TABLET ORAL
Qty: 30 TABLET | Refills: 0 | Status: SHIPPED | OUTPATIENT
Start: 2025-05-13 | End: 2025-05-18

## 2025-05-13 NOTE — TELEPHONE ENCOUNTER
Medication: bisoprolol-hydroCHLOROthiazide (ZIAC) 2.5-6.25 MG     Last Filled: 05/12/2025      Pharmacy: [facility] on [address]     Last appt: 7/15/2024   Next appt: 6/5/2025    Last OARRS:        No data to display               Patient needs to have a prescription of bisoprolol-hydroCHLOROthiazide (ZIAC) 2.5-6.25 MG per tablet sent to the [facility] on [address] in [place].  Patient did have a prescription sent to [facility] which she states is fine but she is completely out of her medication and would like to have on sent to [facility] also.

## 2025-05-17 DIAGNOSIS — R03.0 ELEVATED BP WITHOUT DIAGNOSIS OF HYPERTENSION: ICD-10-CM

## 2025-05-18 RX ORDER — BISOPROLOL FUMARATE AND HYDROCHLOROTHIAZIDE 2.5; 6.25 MG/1; MG/1
1 TABLET ORAL DAILY
Qty: 90 TABLET | Refills: 0 | Status: SHIPPED | OUTPATIENT
Start: 2025-05-18

## 2025-06-05 ENCOUNTER — OFFICE VISIT (OUTPATIENT)
Dept: INTERNAL MEDICINE CLINIC | Age: 50
End: 2025-06-05

## 2025-06-05 VITALS
HEART RATE: 80 BPM | WEIGHT: 142 LBS | OXYGEN SATURATION: 97 % | HEIGHT: 62 IN | SYSTOLIC BLOOD PRESSURE: 120 MMHG | DIASTOLIC BLOOD PRESSURE: 80 MMHG | BODY MASS INDEX: 26.13 KG/M2

## 2025-06-05 DIAGNOSIS — F33.1 MODERATE EPISODE OF RECURRENT MAJOR DEPRESSIVE DISORDER (HCC): Primary | ICD-10-CM

## 2025-06-05 DIAGNOSIS — I10 PRIMARY HYPERTENSION: ICD-10-CM

## 2025-06-05 DIAGNOSIS — G47.33 OBSTRUCTIVE SLEEP APNEA: ICD-10-CM

## 2025-06-05 DIAGNOSIS — M25.50 ARTHRALGIA, UNSPECIFIED JOINT: ICD-10-CM

## 2025-06-05 DIAGNOSIS — M79.671 PAIN OF RIGHT HEEL: ICD-10-CM

## 2025-06-05 DIAGNOSIS — R40.0 DAYTIME SLEEPINESS: ICD-10-CM

## 2025-06-05 RX ORDER — NORETHINDRONE 0.35 MG/1
1 TABLET ORAL DAILY
COMMUNITY
Start: 2025-05-10

## 2025-06-05 RX ORDER — DESVENLAFAXINE 50 MG/1
50 TABLET, FILM COATED, EXTENDED RELEASE ORAL DAILY
Qty: 30 TABLET | Refills: 3 | Status: SHIPPED | OUTPATIENT
Start: 2025-06-05

## 2025-06-05 RX ORDER — BISOPROLOL FUMARATE AND HYDROCHLOROTHIAZIDE 2.5; 6.25 MG/1; MG/1
1 TABLET ORAL DAILY
Qty: 90 TABLET | Refills: 3 | Status: SHIPPED | OUTPATIENT
Start: 2025-06-05

## 2025-06-05 SDOH — ECONOMIC STABILITY: FOOD INSECURITY: WITHIN THE PAST 12 MONTHS, YOU WORRIED THAT YOUR FOOD WOULD RUN OUT BEFORE YOU GOT MONEY TO BUY MORE.: NEVER TRUE

## 2025-06-05 SDOH — ECONOMIC STABILITY: FOOD INSECURITY: WITHIN THE PAST 12 MONTHS, THE FOOD YOU BOUGHT JUST DIDN'T LAST AND YOU DIDN'T HAVE MONEY TO GET MORE.: NEVER TRUE

## 2025-06-05 ASSESSMENT — PATIENT HEALTH QUESTIONNAIRE - PHQ9
8. MOVING OR SPEAKING SO SLOWLY THAT OTHER PEOPLE COULD HAVE NOTICED. OR THE OPPOSITE, BEING SO FIGETY OR RESTLESS THAT YOU HAVE BEEN MOVING AROUND A LOT MORE THAN USUAL: SEVERAL DAYS
4. FEELING TIRED OR HAVING LITTLE ENERGY: NEARLY EVERY DAY
SUM OF ALL RESPONSES TO PHQ QUESTIONS 1-9: 15
SUM OF ALL RESPONSES TO PHQ QUESTIONS 1-9: 16
5. POOR APPETITE OR OVEREATING: SEVERAL DAYS
7. TROUBLE CONCENTRATING ON THINGS, SUCH AS READING THE NEWSPAPER OR WATCHING TELEVISION: SEVERAL DAYS
2. FEELING DOWN, DEPRESSED OR HOPELESS: MORE THAN HALF THE DAYS
SUM OF ALL RESPONSES TO PHQ QUESTIONS 1-9: 16
6. FEELING BAD ABOUT YOURSELF - OR THAT YOU ARE A FAILURE OR HAVE LET YOURSELF OR YOUR FAMILY DOWN: MORE THAN HALF THE DAYS
3. TROUBLE FALLING OR STAYING ASLEEP: NEARLY EVERY DAY
10. IF YOU CHECKED OFF ANY PROBLEMS, HOW DIFFICULT HAVE THESE PROBLEMS MADE IT FOR YOU TO DO YOUR WORK, TAKE CARE OF THINGS AT HOME, OR GET ALONG WITH OTHER PEOPLE: SOMEWHAT DIFFICULT
SUM OF ALL RESPONSES TO PHQ QUESTIONS 1-9: 16
9. THOUGHTS THAT YOU WOULD BE BETTER OFF DEAD, OR OF HURTING YOURSELF: SEVERAL DAYS
1. LITTLE INTEREST OR PLEASURE IN DOING THINGS: MORE THAN HALF THE DAYS

## 2025-06-05 NOTE — PROGRESS NOTES
Refill: 3  - Comprehensive Metabolic Panel; Future  - Lipid, Fasting; Future    6. Arthralgia, unspecified joint  R/o autoimmune or inflammatory arthritis.    - CBC with Auto Differential; Future  - C-Reactive Protein; Future  - Sedimentation Rate; Future  - Rheumatoid Factor; Future  - Cyclic Citrul Peptide Antibody, IgG; Future        FOLLOW UP:  Follow up with ninfa in 5-6 weeks for depression medication change.      HPI  She is here for medication refills and a couple of other issues.     Not needing inhaler since she was sick.  Pleased this is better.     C/o Having a lot of joint pains,  probably arthritis.  Low back is a mess.  Does not sleep well and not sure if poor sleep contributes to pain or the other way around.      Right foot heel pain, jimmy when first gets up in the AM.  Has a thickened area over the heel where it is tender.      Very tired.   She does snore pretty bad.  She did a sleep apnea test in the past and told mild.  Put in mouth appliance but she never liked it or used it.  Did not do CPAP or CPAP trial.    Still under a lot of stress at home.      See assessment above for other issues addressed at this visit.    Outpatient Medications Marked as Taking for the 6/5/25 encounter (Office Visit) with Ruby Cowart MD   Medication Sig Dispense Refill    JENCYCLA 0.35 MG tablet Take 1 tablet by mouth daily Norethindrone.      bisoprolol-hydroCHLOROthiazide (ZIAC) 2.5-6.25 MG per tablet Take 1 tablet by mouth daily 90 tablet 0    sertraline (ZOLOFT) 100 MG tablet TAKE ONE AND ONE-HALF TABLETS DAILY (Patient taking differently: Take 2 tablets by mouth daily) 60 tablet 0    albuterol sulfate HFA (VENTOLIN HFA) 108 (90 Base) MCG/ACT inhaler Inhale 2 puffs into the lungs 4 times daily as needed for Wheezing 18 g 0    montelukast (SINGULAIR) 10 MG tablet Take 1 tablet by mouth daily During allergy season 90 tablet 3    naproxen (NAPROSYN) 500 MG tablet Take 1 tablet by mouth 2 times daily as

## 2025-06-06 ENCOUNTER — TELEPHONE (OUTPATIENT)
Dept: ADMINISTRATIVE | Age: 50
End: 2025-06-06

## 2025-06-09 ENCOUNTER — TELEMEDICINE (OUTPATIENT)
Age: 50
End: 2025-06-09
Payer: COMMERCIAL

## 2025-06-09 DIAGNOSIS — F43.23 ADJUSTMENT DISORDER WITH MIXED ANXIETY AND DEPRESSED MOOD: Primary | ICD-10-CM

## 2025-06-09 PROCEDURE — 90791 PSYCH DIAGNOSTIC EVALUATION: CPT | Performed by: PSYCHOLOGIST

## 2025-06-09 ASSESSMENT — PATIENT HEALTH QUESTIONNAIRE - PHQ9
10. IF YOU CHECKED OFF ANY PROBLEMS, HOW DIFFICULT HAVE THESE PROBLEMS MADE IT FOR YOU TO DO YOUR WORK, TAKE CARE OF THINGS AT HOME, OR GET ALONG WITH OTHER PEOPLE: SOMEWHAT DIFFICULT
9. THOUGHTS THAT YOU WOULD BE BETTER OFF DEAD, OR OF HURTING YOURSELF: SEVERAL DAYS
8. MOVING OR SPEAKING SO SLOWLY THAT OTHER PEOPLE COULD HAVE NOTICED. OR THE OPPOSITE - BEING SO FIDGETY OR RESTLESS THAT YOU HAVE BEEN MOVING AROUND A LOT MORE THAN USUAL: NOT AT ALL
SUM OF ALL RESPONSES TO PHQ QUESTIONS 1-9: 13
SUM OF ALL RESPONSES TO PHQ QUESTIONS 1-9: 12
5. POOR APPETITE OR OVEREATING: SEVERAL DAYS
10. IF YOU CHECKED OFF ANY PROBLEMS, HOW DIFFICULT HAVE THESE PROBLEMS MADE IT FOR YOU TO DO YOUR WORK, TAKE CARE OF THINGS AT HOME, OR GET ALONG WITH OTHER PEOPLE: SOMEWHAT DIFFICULT
9. THOUGHTS THAT YOU WOULD BE BETTER OFF DEAD, OR OF HURTING YOURSELF: SEVERAL DAYS
8. MOVING OR SPEAKING SO SLOWLY THAT OTHER PEOPLE COULD HAVE NOTICED. OR THE OPPOSITE, BEING SO FIGETY OR RESTLESS THAT YOU HAVE BEEN MOVING AROUND A LOT MORE THAN USUAL: NOT AT ALL
1. LITTLE INTEREST OR PLEASURE IN DOING THINGS: MORE THAN HALF THE DAYS
5. POOR APPETITE OR OVEREATING: SEVERAL DAYS
2. FEELING DOWN, DEPRESSED OR HOPELESS: MORE THAN HALF THE DAYS
SUM OF ALL RESPONSES TO PHQ QUESTIONS 1-9: 13
6. FEELING BAD ABOUT YOURSELF - OR THAT YOU ARE A FAILURE OR HAVE LET YOURSELF OR YOUR FAMILY DOWN: MORE THAN HALF THE DAYS
1. LITTLE INTEREST OR PLEASURE IN DOING THINGS: MORE THAN HALF THE DAYS
3. TROUBLE FALLING OR STAYING ASLEEP: SEVERAL DAYS
7. TROUBLE CONCENTRATING ON THINGS, SUCH AS READING THE NEWSPAPER OR WATCHING TELEVISION: SEVERAL DAYS
SUM OF ALL RESPONSES TO PHQ QUESTIONS 1-9: 13
6. FEELING BAD ABOUT YOURSELF - OR THAT YOU ARE A FAILURE OR HAVE LET YOURSELF OR YOUR FAMILY DOWN: MORE THAN HALF THE DAYS
2. FEELING DOWN, DEPRESSED OR HOPELESS: MORE THAN HALF THE DAYS
SUM OF ALL RESPONSES TO PHQ QUESTIONS 1-9: 13
7. TROUBLE CONCENTRATING ON THINGS, SUCH AS READING THE NEWSPAPER OR WATCHING TELEVISION: SEVERAL DAYS
3. TROUBLE FALLING OR STAYING ASLEEP: SEVERAL DAYS
4. FEELING TIRED OR HAVING LITTLE ENERGY: NEARLY EVERY DAY
4. FEELING TIRED OR HAVING LITTLE ENERGY: NEARLY EVERY DAY

## 2025-06-09 ASSESSMENT — LIFESTYLE VARIABLES
HAVE YOU EVER RECEIVED ALCOHOL OR OTHER DRUG ABUSE TREATMENT: NO
HISTORY_ALCOHOL_USE: NO
PAST THREE MONTHS WHAT IS THE LARGEST AMOUNT OF ALCOHOLIC DRINKS YOU HAVE CONSUMED IN ONE DAY: 4
ALCOHOL_DAYS_PER_WEEK: 7

## 2025-06-09 ASSESSMENT — ANXIETY QUESTIONNAIRES
7. FEELING AFRAID AS IF SOMETHING AWFUL MIGHT HAPPEN: NEARLY EVERY DAY
3. WORRYING TOO MUCH ABOUT DIFFERENT THINGS: SEVERAL DAYS
7. FEELING AFRAID AS IF SOMETHING AWFUL MIGHT HAPPEN: NEARLY EVERY DAY
6. BECOMING EASILY ANNOYED OR IRRITABLE: SEVERAL DAYS
2. NOT BEING ABLE TO STOP OR CONTROL WORRYING: NEARLY EVERY DAY
4. TROUBLE RELAXING: SEVERAL DAYS
IF YOU CHECKED OFF ANY PROBLEMS ON THIS QUESTIONNAIRE, HOW DIFFICULT HAVE THESE PROBLEMS MADE IT FOR YOU TO DO YOUR WORK, TAKE CARE OF THINGS AT HOME, OR GET ALONG WITH OTHER PEOPLE: SOMEWHAT DIFFICULT
2. NOT BEING ABLE TO STOP OR CONTROL WORRYING: NEARLY EVERY DAY
6. BECOMING EASILY ANNOYED OR IRRITABLE: SEVERAL DAYS
1. FEELING NERVOUS, ANXIOUS, OR ON EDGE: SEVERAL DAYS
GAD7 TOTAL SCORE: 10
4. TROUBLE RELAXING: SEVERAL DAYS
5. BEING SO RESTLESS THAT IT IS HARD TO SIT STILL: NOT AT ALL
1. FEELING NERVOUS, ANXIOUS, OR ON EDGE: SEVERAL DAYS
3. WORRYING TOO MUCH ABOUT DIFFERENT THINGS: SEVERAL DAYS
IF YOU CHECKED OFF ANY PROBLEMS ON THIS QUESTIONNAIRE, HOW DIFFICULT HAVE THESE PROBLEMS MADE IT FOR YOU TO DO YOUR WORK, TAKE CARE OF THINGS AT HOME, OR GET ALONG WITH OTHER PEOPLE: SOMEWHAT DIFFICULT
5. BEING SO RESTLESS THAT IT IS HARD TO SIT STILL: NOT AT ALL

## 2025-06-09 ASSESSMENT — COLUMBIA-SUICIDE SEVERITY RATING SCALE - C-SSRS
2. IN THE PAST MONTH, HAVE YOU ACTUALLY HAD ANY THOUGHTS OF KILLING YOURSELF?: NO
6. IN YOUR LIFETIME, HAVE YOU EVER DONE ANYTHING, STARTED TO DO ANYTHING, OR PREPARED TO DO ANYTHING TO END YOUR LIFE?: NO
1. IN THE PAST MONTH, HAVE YOU WISHED YOU WERE DEAD OR WISHED YOU COULD GO TO SLEEP AND NOT WAKE UP?: YES

## 2025-06-09 NOTE — PROGRESS NOTES
[facility]   Tobacco Use    Smoking status: Never     Passive exposure: Never    Smokeless tobacco: Never   Vaping Use    Vaping status: Never Used   Substance and Sexual Activity    Alcohol use: Yes     Alcohol/week: 7.0 - 10.0 standard drinks of alcohol     Types: 7 - 10 Standard drinks or equivalent per week    Drug use: No    Sexual activity: Yes     Partners: Male     Birth control/protection: Pill   Other Topics Concern    Not on file   Social History Narrative    Lives in home with her .  Youngest Daughter and adult son living in home.     Social Drivers of Health     Financial Resource Strain: Low Risk  (7/15/2024)    Overall Financial Resource Strain (CARDIA)     Difficulty of Paying Living Expenses: Not hard at all   Food Insecurity: No Food Insecurity (6/5/2025)    Hunger Vital Sign     Worried About Running Out of Food in the Last Year: Never true     Ran Out of Food in the Last Year: Never true   Transportation Needs: No Transportation Needs (6/5/2025)    PRAPARE - Transportation     Lack of Transportation (Medical): No     Lack of Transportation (Non-Medical): No   Physical Activity: Sufficiently Active (7/13/2023)    Exercise Vital Sign     Days of Exercise per Week: 5 days     Minutes of Exercise per Session: 30 min   Stress: Not on file   Social Connections: Not on file   Intimate Partner Violence: Not on file   Housing Stability: Low Risk  (6/5/2025)    Housing Stability Vital Sign     Unable to Pay for Housing in the Last Year: No     Number of Times Moved in the Last Year: 0     Homeless in the Last Year: No     Family History:   Family History   Problem Relation Age of Onset    Asthma Mother     Rheum Arthritis Mother     Osteoporosis Mother     Depression Mother     High Cholesterol Mother     High Blood Pressure Father     Other Father         IBS    High Cholesterol Father     Thyroid Disease Sister     Other Brother         IBS    Heart Disease Paternal Grandfather     Anxiety

## 2025-06-18 DIAGNOSIS — R40.0 DAYTIME SLEEPINESS: ICD-10-CM

## 2025-06-18 DIAGNOSIS — F33.1 MODERATE EPISODE OF RECURRENT MAJOR DEPRESSIVE DISORDER (HCC): ICD-10-CM

## 2025-06-18 DIAGNOSIS — G47.33 OBSTRUCTIVE SLEEP APNEA: ICD-10-CM

## 2025-06-18 DIAGNOSIS — M25.50 ARTHRALGIA, UNSPECIFIED JOINT: ICD-10-CM

## 2025-06-18 DIAGNOSIS — I10 PRIMARY HYPERTENSION: ICD-10-CM

## 2025-06-19 ENCOUNTER — TELEPHONE (OUTPATIENT)
Dept: INTERNAL MEDICINE CLINIC | Age: 50
End: 2025-06-19

## 2025-06-19 ENCOUNTER — RESULTS FOLLOW-UP (OUTPATIENT)
Dept: INTERNAL MEDICINE CLINIC | Age: 50
End: 2025-06-19

## 2025-06-19 LAB
ALBUMIN SERPL-MCNC: 4.5 G/DL (ref 3.4–5)
ALBUMIN/GLOB SERPL: 1.9 {RATIO} (ref 1.1–2.2)
ALP SERPL-CCNC: 60 U/L (ref 40–129)
ALT SERPL-CCNC: 11 U/L (ref 10–40)
ANION GAP SERPL CALCULATED.3IONS-SCNC: 11 MMOL/L (ref 3–16)
AST SERPL-CCNC: 17 U/L (ref 15–37)
BASOPHILS # BLD: 0 K/UL (ref 0–0.2)
BASOPHILS NFR BLD: 0.9 %
BILIRUB SERPL-MCNC: 0.4 MG/DL (ref 0–1)
BUN SERPL-MCNC: 10 MG/DL (ref 7–20)
CALCIUM SERPL-MCNC: 9.5 MG/DL (ref 8.3–10.6)
CHLORIDE SERPL-SCNC: 102 MMOL/L (ref 99–110)
CHOLEST SERPL-MCNC: 268 MG/DL (ref 0–199)
CO2 SERPL-SCNC: 24 MMOL/L (ref 21–32)
CREAT SERPL-MCNC: 0.7 MG/DL (ref 0.6–1.1)
CRP SERPL-MCNC: 4.8 MG/L (ref 0–5.1)
DEPRECATED RDW RBC AUTO: 14.5 % (ref 12.4–15.4)
EOSINOPHIL # BLD: 0.1 K/UL (ref 0–0.6)
EOSINOPHIL NFR BLD: 1.5 %
ERYTHROCYTE [SEDIMENTATION RATE] IN BLOOD BY WESTERGREN METHOD: 7 MM/HR (ref 0–20)
GFR SERPLBLD CREATININE-BSD FMLA CKD-EPI: >90 ML/MIN/{1.73_M2}
GLUCOSE SERPL-MCNC: 83 MG/DL (ref 70–99)
HCT VFR BLD AUTO: 41.5 % (ref 36–48)
HDLC SERPL-MCNC: 82 MG/DL (ref 40–60)
HGB BLD-MCNC: 13.7 G/DL (ref 12–16)
LDL CHOLESTEROL: 176 MG/DL
LYMPHOCYTES # BLD: 2.1 K/UL (ref 1–5.1)
LYMPHOCYTES NFR BLD: 40.8 %
MCH RBC QN AUTO: 29.8 PG (ref 26–34)
MCHC RBC AUTO-ENTMCNC: 32.9 G/DL (ref 31–36)
MCV RBC AUTO: 90.5 FL (ref 80–100)
MONOCYTES # BLD: 0.5 K/UL (ref 0–1.3)
MONOCYTES NFR BLD: 9.1 %
NEUTROPHILS # BLD: 2.4 K/UL (ref 1.7–7.7)
NEUTROPHILS NFR BLD: 47.7 %
PLATELET # BLD AUTO: 321 K/UL (ref 135–450)
PMV BLD AUTO: 9.1 FL (ref 5–10.5)
POTASSIUM SERPL-SCNC: 4.5 MMOL/L (ref 3.5–5.1)
PROT SERPL-MCNC: 6.9 G/DL (ref 6.4–8.2)
RBC # BLD AUTO: 4.58 M/UL (ref 4–5.2)
RHEUMATOID FACT SER IA-ACNC: <10 IU/ML
SODIUM SERPL-SCNC: 137 MMOL/L (ref 136–145)
TRIGL SERPL-MCNC: 50 MG/DL (ref 0–150)
TSH SERPL DL<=0.005 MIU/L-ACNC: 1.95 UIU/ML (ref 0.27–4.2)
VIT B12 SERPL-MCNC: 212 PG/ML (ref 211–911)
VLDLC SERPL CALC-MCNC: 10 MG/DL
WBC # BLD AUTO: 5.1 K/UL (ref 4–11)

## 2025-06-19 NOTE — TELEPHONE ENCOUNTER
Pt went to donate blood today and her BP was 98/60 via manual cuff. She takes her bp medication at hs which she did last night and said her numbers usually are a little on the high side when she does not take her medication.    Her Hgb was also checked prior to the blood draw and was checked via a finger pressure monitor device and one finger noted Hgb 7.1 and another finger 8.2     She was not able to donate blood d/t those readings and was advised to let her PCP know those readings.    She had blood work yesterday and her Hgb was in the normal range 13.7    She states she feels good today and not reporting any dizziness, sob, chest pain, headaches. Yesterday she reports she was very fatigued and had trouble finding her words but today is totally fine.

## 2025-06-19 NOTE — TELEPHONE ENCOUNTER
I suspect there anemia testing was inaccurate and maybe due to low BP and not getting as much blood flow to the fingertips.      If you are willing to check your BP at home a couple times a day (morning and night vs morning and afternoon) for 1 week every month for a while, I will change your BP med only to bisoprolol without the HCTZ or even change it to other medication.      Xxxxxxxxxxxxxxxxxxxxxxxxxxxxxxxxxxxx    Lab results:   Your cholesterol is very high compared to prior years.  At your age and risk factors you do not need medication yet but hopefully you can clean up your diet.  Mediterranean diet is a great heart healthy diet.    B12 is also bit low.  I do encourage you to take a multiple vitamin OR a B12 pill Or a B complex pill daily for multi and daily to every other day for the other options.    All other labs are normal.

## 2025-06-20 LAB — CCP IGG SERPL-ACNC: 0.6 U/ML (ref 0–2.9)

## 2025-06-30 ENCOUNTER — TELEMEDICINE (OUTPATIENT)
Age: 50
End: 2025-06-30
Payer: COMMERCIAL

## 2025-06-30 DIAGNOSIS — F43.23 ADJUSTMENT DISORDER WITH MIXED ANXIETY AND DEPRESSED MOOD: Primary | ICD-10-CM

## 2025-06-30 PROCEDURE — 90832 PSYTX W PT 30 MINUTES: CPT | Performed by: PSYCHOLOGIST

## 2025-06-30 ASSESSMENT — PATIENT HEALTH QUESTIONNAIRE - PHQ9
2. FEELING DOWN, DEPRESSED OR HOPELESS: SEVERAL DAYS
SUM OF ALL RESPONSES TO PHQ QUESTIONS 1-9: 9
SUM OF ALL RESPONSES TO PHQ QUESTIONS 1-9: 9
9. THOUGHTS THAT YOU WOULD BE BETTER OFF DEAD, OR OF HURTING YOURSELF: SEVERAL DAYS
SUM OF ALL RESPONSES TO PHQ QUESTIONS 1-9: 9
10. IF YOU CHECKED OFF ANY PROBLEMS, HOW DIFFICULT HAVE THESE PROBLEMS MADE IT FOR YOU TO DO YOUR WORK, TAKE CARE OF THINGS AT HOME, OR GET ALONG WITH OTHER PEOPLE: SOMEWHAT DIFFICULT
7. TROUBLE CONCENTRATING ON THINGS, SUCH AS READING THE NEWSPAPER OR WATCHING TELEVISION: SEVERAL DAYS
9. THOUGHTS THAT YOU WOULD BE BETTER OFF DEAD, OR OF HURTING YOURSELF: SEVERAL DAYS
4. FEELING TIRED OR HAVING LITTLE ENERGY: MORE THAN HALF THE DAYS
6. FEELING BAD ABOUT YOURSELF - OR THAT YOU ARE A FAILURE OR HAVE LET YOURSELF OR YOUR FAMILY DOWN: MORE THAN HALF THE DAYS
4. FEELING TIRED OR HAVING LITTLE ENERGY: MORE THAN HALF THE DAYS
10. IF YOU CHECKED OFF ANY PROBLEMS, HOW DIFFICULT HAVE THESE PROBLEMS MADE IT FOR YOU TO DO YOUR WORK, TAKE CARE OF THINGS AT HOME, OR GET ALONG WITH OTHER PEOPLE: SOMEWHAT DIFFICULT
1. LITTLE INTEREST OR PLEASURE IN DOING THINGS: SEVERAL DAYS
2. FEELING DOWN, DEPRESSED OR HOPELESS: SEVERAL DAYS
5. POOR APPETITE OR OVEREATING: NOT AT ALL
SUM OF ALL RESPONSES TO PHQ QUESTIONS 1-9: 8
SUM OF ALL RESPONSES TO PHQ QUESTIONS 1-9: 9
7. TROUBLE CONCENTRATING ON THINGS, SUCH AS READING THE NEWSPAPER OR WATCHING TELEVISION: SEVERAL DAYS
8. MOVING OR SPEAKING SO SLOWLY THAT OTHER PEOPLE COULD HAVE NOTICED. OR THE OPPOSITE - BEING SO FIDGETY OR RESTLESS THAT YOU HAVE BEEN MOVING AROUND A LOT MORE THAN USUAL: NOT AT ALL
1. LITTLE INTEREST OR PLEASURE IN DOING THINGS: SEVERAL DAYS
8. MOVING OR SPEAKING SO SLOWLY THAT OTHER PEOPLE COULD HAVE NOTICED. OR THE OPPOSITE, BEING SO FIGETY OR RESTLESS THAT YOU HAVE BEEN MOVING AROUND A LOT MORE THAN USUAL: NOT AT ALL
3. TROUBLE FALLING OR STAYING ASLEEP: SEVERAL DAYS
5. POOR APPETITE OR OVEREATING: NOT AT ALL
6. FEELING BAD ABOUT YOURSELF - OR THAT YOU ARE A FAILURE OR HAVE LET YOURSELF OR YOUR FAMILY DOWN: MORE THAN HALF THE DAYS
3. TROUBLE FALLING OR STAYING ASLEEP: SEVERAL DAYS

## 2025-06-30 ASSESSMENT — ANXIETY QUESTIONNAIRES
1. FEELING NERVOUS, ANXIOUS, OR ON EDGE: SEVERAL DAYS
3. WORRYING TOO MUCH ABOUT DIFFERENT THINGS: MORE THAN HALF THE DAYS
1. FEELING NERVOUS, ANXIOUS, OR ON EDGE: SEVERAL DAYS
5. BEING SO RESTLESS THAT IT IS HARD TO SIT STILL: MORE THAN HALF THE DAYS
GAD7 TOTAL SCORE: 11
6. BECOMING EASILY ANNOYED OR IRRITABLE: SEVERAL DAYS
7. FEELING AFRAID AS IF SOMETHING AWFUL MIGHT HAPPEN: SEVERAL DAYS
2. NOT BEING ABLE TO STOP OR CONTROL WORRYING: MORE THAN HALF THE DAYS
IF YOU CHECKED OFF ANY PROBLEMS ON THIS QUESTIONNAIRE, HOW DIFFICULT HAVE THESE PROBLEMS MADE IT FOR YOU TO DO YOUR WORK, TAKE CARE OF THINGS AT HOME, OR GET ALONG WITH OTHER PEOPLE: SOMEWHAT DIFFICULT
3. WORRYING TOO MUCH ABOUT DIFFERENT THINGS: MORE THAN HALF THE DAYS
2. NOT BEING ABLE TO STOP OR CONTROL WORRYING: MORE THAN HALF THE DAYS
5. BEING SO RESTLESS THAT IT IS HARD TO SIT STILL: MORE THAN HALF THE DAYS
6. BECOMING EASILY ANNOYED OR IRRITABLE: SEVERAL DAYS
IF YOU CHECKED OFF ANY PROBLEMS ON THIS QUESTIONNAIRE, HOW DIFFICULT HAVE THESE PROBLEMS MADE IT FOR YOU TO DO YOUR WORK, TAKE CARE OF THINGS AT HOME, OR GET ALONG WITH OTHER PEOPLE: SOMEWHAT DIFFICULT
4. TROUBLE RELAXING: MORE THAN HALF THE DAYS
7. FEELING AFRAID AS IF SOMETHING AWFUL MIGHT HAPPEN: SEVERAL DAYS
4. TROUBLE RELAXING: MORE THAN HALF THE DAYS

## 2025-06-30 ASSESSMENT — COLUMBIA-SUICIDE SEVERITY RATING SCALE - C-SSRS
2. IN THE PAST MONTH, HAVE YOU ACTUALLY HAD ANY THOUGHTS OF KILLING YOURSELF?: NO
1. IN THE PAST MONTH, HAVE YOU WISHED YOU WERE DEAD OR WISHED YOU COULD GO TO SLEEP AND NOT WAKE UP?: YES
6. IN YOUR LIFETIME, HAVE YOU EVER DONE ANYTHING, STARTED TO DO ANYTHING, OR PREPARED TO DO ANYTHING TO END YOUR LIFE?: NO

## 2025-06-30 NOTE — PROGRESS NOTES
6/30/2025     2:31 PM 6/9/2025     1:59 PM 6/5/2025     5:05 PM   PHQ Scores   PHQ2 Score 2  4  4   PHQ9 Score 9  13  16       Patient-reported   Interpretation of Total Score Depression Severity: 1-4 = Minimal depression, 5-9 = Mild depression, 10-14 = Moderate depression, 15-19 = Moderately severe depression, 20-27 = Severe depression    Diagnosis:  1. Adjustment disorder with mixed anxiety and depressed mood       Plan:  Interventions  Collaboratively discussed recent stressors, provided support and validation. Reviewed progress and provided praise for effective coping.  Reflected on new boundaries and related values and needs.  Discussed avenues of support for her related to son's MH and substance use - discussed community resources and potential value.       Behavioral Change Plan  See above.  Return in 22 days (on 7/22/2025).  Will fluidly adjust follow up interval in response to severity of symptoms, impact on functioning, and patient's goals.      Daly Norman, was evaluated through a synchronous (real-time) audio-video encounter. The patient (or guardian if applicable) is aware that this is a billable service, which includes applicable co-pays. This Virtual Visit was conducted with patient's (and/or legal guardian's) consent. Patient identification was verified, and a caregiver was present when appropriate.   The patient was located at Home: [address] [place] [address]  Provider was located at Other: [place], [place]  Confirm you are appropriately licensed, registered, or certified to deliver care in the state where the patient is located as indicated above. If you are not or unsure, please re-schedule the visit: Yes, I confirm.      Total time spent for this encounter: 30 minutes    --Marissa Mendez, PhD on 7/2/2025 at 1:58 PM    An electronic signature was used to authenticate this note.     Documentation was done using voice recognition dragon software.  Every effort was made to ensure

## 2025-07-22 ASSESSMENT — SLEEP AND FATIGUE QUESTIONNAIRES
HOW LIKELY ARE YOU TO NOD OFF OR FALL ASLEEP WHILE SITTING QUIETLY AFTER LUNCH WITHOUT ALCOHOL: WOULD NEVER DOZE
HOW LIKELY ARE YOU TO NOD OFF OR FALL ASLEEP WHILE SITTING AND READING: SLIGHT CHANCE OF DOZING
HOW LIKELY ARE YOU TO NOD OFF OR FALL ASLEEP WHILE SITTING AND READING: SLIGHT CHANCE OF DOZING
HOW LIKELY ARE YOU TO NOD OFF OR FALL ASLEEP WHILE LYING DOWN TO REST IN THE AFTERNOON WHEN CIRCUMSTANCES PERMIT: HIGH CHANCE OF DOZING
HOW LIKELY ARE YOU TO NOD OFF OR FALL ASLEEP WHILE SITTING INACTIVE IN A PUBLIC PLACE: SLIGHT CHANCE OF DOZING
HOW LIKELY ARE YOU TO NOD OFF OR FALL ASLEEP WHILE SITTING AND TALKING TO SOMEONE: WOULD NEVER DOZE
HOW LIKELY ARE YOU TO NOD OFF OR FALL ASLEEP WHEN YOU ARE A PASSENGER IN A CAR FOR AN HOUR WITHOUT A BREAK: MODERATE CHANCE OF DOZING
HOW LIKELY ARE YOU TO NOD OFF OR FALL ASLEEP IN A CAR, WHILE STOPPED FOR A FEW MINUTES IN TRAFFIC: SLIGHT CHANCE OF DOZING
HOW LIKELY ARE YOU TO NOD OFF OR FALL ASLEEP WHILE LYING DOWN TO REST IN THE AFTERNOON WHEN CIRCUMSTANCES PERMIT: HIGH CHANCE OF DOZING
ESS TOTAL SCORE: 10
HOW LIKELY ARE YOU TO NOD OFF OR FALL ASLEEP WHEN YOU ARE A PASSENGER IN A CAR FOR AN HOUR WITHOUT A BREAK: MODERATE CHANCE OF DOZING
HOW LIKELY ARE YOU TO NOD OFF OR FALL ASLEEP WHILE SITTING QUIETLY AFTER LUNCH WITHOUT ALCOHOL: WOULD NEVER DOZE
HOW LIKELY ARE YOU TO NOD OFF OR FALL ASLEEP WHILE SITTING AND TALKING TO SOMEONE: WOULD NEVER DOZE
HOW LIKELY ARE YOU TO NOD OFF OR FALL ASLEEP WHILE WATCHING TV: MODERATE CHANCE OF DOZING
HOW LIKELY ARE YOU TO NOD OFF OR FALL ASLEEP IN A CAR, WHILE STOPPED FOR A FEW MINUTES IN TRAFFIC: SLIGHT CHANCE OF DOZING
HOW LIKELY ARE YOU TO NOD OFF OR FALL ASLEEP WHILE WATCHING TV: MODERATE CHANCE OF DOZING
HOW LIKELY ARE YOU TO NOD OFF OR FALL ASLEEP WHILE SITTING INACTIVE IN A PUBLIC PLACE: SLIGHT CHANCE OF DOZING

## 2025-07-23 ENCOUNTER — OFFICE VISIT (OUTPATIENT)
Dept: INTERNAL MEDICINE CLINIC | Age: 50
End: 2025-07-23
Payer: COMMERCIAL

## 2025-07-23 VITALS
HEART RATE: 88 BPM | WEIGHT: 143 LBS | DIASTOLIC BLOOD PRESSURE: 80 MMHG | SYSTOLIC BLOOD PRESSURE: 122 MMHG | HEIGHT: 62 IN | BODY MASS INDEX: 26.31 KG/M2

## 2025-07-23 DIAGNOSIS — J34.89 NASAL VESTIBULITIS: Primary | ICD-10-CM

## 2025-07-23 PROCEDURE — 99213 OFFICE O/P EST LOW 20 MIN: CPT | Performed by: FAMILY MEDICINE

## 2025-07-23 RX ORDER — MUPIROCIN 2 %
OINTMENT (GRAM) TOPICAL
Qty: 22 G | Refills: 0 | Status: SHIPPED | OUTPATIENT
Start: 2025-07-23

## 2025-07-23 RX ORDER — SULFAMETHOXAZOLE AND TRIMETHOPRIM 800; 160 MG/1; MG/1
1 TABLET ORAL 2 TIMES DAILY
COMMUNITY
Start: 2025-07-17 | End: 2025-07-25 | Stop reason: ALTCHOICE

## 2025-07-23 NOTE — PROGRESS NOTES
2025    Daly Norman (:  1975) is a 49 y.o. female, here for evaluation of the following chief complaint(s):  Follow-up        ASSESSMENT/PLAN:    1. Nasal vestibulitis  Discussed that can stop the ABX pills once the nose feels better or OK to complete the Bactrim for 10 days.   - sulfamethoxazole-trimethoprim (BACTRIM DS;SEPTRA DS) 800-160 MG per tablet; Take 1 tablet by mouth 2 times daily  - mupirocin (BACTROBAN) 2 % ointment; Apply topically 3 times daily for 10 days  Dispense: 22 g; Refill: 0  - Culture, MRSA, Screening; Future      FOLLOW UP:  Call or return to clinic prn if these symptoms worsen or fail to improve as anticipated.      HPI    Urgent care follow up.  Told she had staph infection in her nose but no swab done.   She is on her 6th - 7th day of Bactrim.     She had/has what is considered painful boils in the nose for a couple of months.  It was getting worse and noted redness to the outside of the nose, so went to Urgent Care.    It is some better but still a little painful.  Does not trim nose hairs.        See assessment above for other issues addressed at this visit.    Outpatient Medications Marked as Taking for the 25 encounter (Office Visit) with Ruby Cowart MD   Medication Sig Dispense Refill    sulfamethoxazole-trimethoprim (BACTRIM DS;SEPTRA DS) 800-160 MG per tablet Take 1 tablet by mouth 2 times daily      JENCYCLA 0.35 MG tablet Take 1 tablet by mouth daily Norethindrone.      desvenlafaxine succinate (PRISTIQ) 50 MG TB24 extended release tablet Take 1 tablet by mouth daily 30 tablet 3    bisoprolol-hydroCHLOROthiazide (ZIAC) 2.5-6.25 MG per tablet Take 1 tablet by mouth daily 90 tablet 3    montelukast (SINGULAIR) 10 MG tablet Take 1 tablet by mouth daily During allergy season 90 tablet 3    naproxen (NAPROSYN) 500 MG tablet Take 1 tablet by mouth 2 times daily as needed for Pain Until pain is tolerable and then as needed. 60 tablet 1    methocarbamol

## 2025-07-25 ENCOUNTER — OFFICE VISIT (OUTPATIENT)
Dept: SLEEP MEDICINE | Age: 50
End: 2025-07-25
Payer: COMMERCIAL

## 2025-07-25 VITALS
HEART RATE: 75 BPM | OXYGEN SATURATION: 99 % | BODY MASS INDEX: 26.54 KG/M2 | SYSTOLIC BLOOD PRESSURE: 120 MMHG | RESPIRATION RATE: 18 BRPM | HEIGHT: 62 IN | WEIGHT: 144.2 LBS | DIASTOLIC BLOOD PRESSURE: 80 MMHG

## 2025-07-25 DIAGNOSIS — G47.33 MILD OBSTRUCTIVE SLEEP APNEA: Primary | ICD-10-CM

## 2025-07-25 DIAGNOSIS — G43.909 MIGRAINE WITHOUT STATUS MIGRAINOSUS, NOT INTRACTABLE, UNSPECIFIED MIGRAINE TYPE: ICD-10-CM

## 2025-07-25 PROCEDURE — 99203 OFFICE O/P NEW LOW 30 MIN: CPT | Performed by: PSYCHIATRY & NEUROLOGY

## 2025-07-25 ASSESSMENT — ENCOUNTER SYMPTOMS
EYES NEGATIVE: 1
ALLERGIC/IMMUNOLOGIC NEGATIVE: 1
RESPIRATORY NEGATIVE: 1

## 2025-07-25 NOTE — PROGRESS NOTES
Daly Norman         : 1975  [] [facility]     [] [facility]      [] [facility]     [][facility]    [] [facility]  [] [facility]   [] [facility]  [] [facility] [] [employer] [] [facility] [] [facility] [] [facility] [] [facility]  Diagnosis: [x] JACK (G47.33) [] CSA (G47.31) [] Apnea (G47.30)   Length of Need: [] 12 Months [x] 99 Months [] Other:    Machine (ANDREW!):  [x] [employer]Med AirSense     Auto [] Other:     [x]  CPAP () [] Bilevel ()   Mode: [x] Auto [] Spontaneous    Mode: [] Auto [] Spontaneous           Between 5 and 12 cm                 Comfort Settings:     If the order for CPAP  - Ramp Pressure: 5 cmH2O                                        - Ramp time: 15 min                                     -  Flex/EPR - 3 full time       Humidifier: [x] Heated ()        [x] Water chamber replacement ()/ 1 per 6 months        Mask:  Please always start with the mask the patient used during the titraion   [x] Nasal () /1 per 3 months [x] Full Face () /1 per 3 months   [x] Patient choice -Size and fit mask [x] Patient Choice - Size and fit mask   [] Dispense:  [] Dispense:    [x] Headgear () / 1 per 6 months [x] Headgear () / 1 per 3 months   [x] Replacement Nasal Cushion ()/2 per month [x] Interface Replacement ()/1 per month   [x] Replacement Nasal Pillows ()/2 per month         Tubing: [x] Heated ()/1 per 3 months    [] Standard ()/1 per 3 months [] Other:           Filters: [x] Non-disposable ()/1 per 6 months     [x] Ultra-Fine, Disposable ()/2 per month        Miscellaneous: [x] Chin Strap ()/ 1 per 6 months [] O2 bleed-in:       LPM   [] Oximetry on CPAP/Bilevel []  Other:          Start Order Date: 25    MEDICAL JUSTIFICATION:  I, the undersigned, certify that the above prescribed supplies are medically necessary for this patient’s wellbeing.  In my opinion, the supplies are both reasonable and

## 2025-07-25 NOTE — PROGRESS NOTES
MD PRESTON Lizama Board Certified in Sleep Medicine  Certified in Behavioral Sleep Medicine  Board Certified in Neurology [facility]  [address]   [address]  [place]  [address]  [phone]   [facility]  [address]  [address]  [place], [place] [address]                      [facility]  [address] [id number] [phone]    Subjective:     Patient ID: Daly Norman is a 49 y.o. female.    Chief Complaint   Patient presents with    [facility] Care    Sleep Apnea       HPI:        Daly Norman is a 49 y.o. female was seen today for obstructive sleep apnea. The patient underwent home sleep testing on 01/19/2021, the overnight registration revealed mild obstructive sleep apnea with apnea hypopnea index of 9.5/hr with lowest O2 saturation of 87%, patient spent about 2.0 minutes below 90% (weight was 143 pounds).   She could not tolerate the  Mandibular advancement device and did not the way she felt  The Patient scored Milan Sleepiness Score: (Patient-Rptd) 10 on Milan Sleepiness Scale ( more than 10 is indicative of daytime sleepiness)     MHA is stable, some time she wakes up with headache. Has not gained weight pounds since last visit.   DOT/CDL - N/A      Previous Report(s)Reviewed: historical medical records         Social History     Socioeconomic History    Marital status:      Spouse name: Ziggy    Number of children: 3    Years of education: 12    Highest education level: Not on file   Occupational History    Occupation:  at [employer]   Tobacco Use    Smoking status: Never     Passive exposure: Never    Smokeless tobacco: Never   Vaping Use    Vaping status: Never Used   Substance and Sexual Activity    Alcohol use: Yes     Alcohol/week: 7.0 - 10.0 standard drinks of alcohol     Types: 7 - 10 Standard

## 2025-08-06 ENCOUNTER — TELEMEDICINE (OUTPATIENT)
Age: 50
End: 2025-08-06
Payer: COMMERCIAL

## 2025-08-06 DIAGNOSIS — F43.23 ADJUSTMENT DISORDER WITH MIXED ANXIETY AND DEPRESSED MOOD: Primary | ICD-10-CM

## 2025-08-06 PROCEDURE — 90832 PSYTX W PT 30 MINUTES: CPT | Performed by: PSYCHOLOGIST

## 2025-08-06 ASSESSMENT — ANXIETY QUESTIONNAIRES
2. NOT BEING ABLE TO STOP OR CONTROL WORRYING: SEVERAL DAYS
6. BECOMING EASILY ANNOYED OR IRRITABLE: SEVERAL DAYS
1. FEELING NERVOUS, ANXIOUS, OR ON EDGE: SEVERAL DAYS
1. FEELING NERVOUS, ANXIOUS, OR ON EDGE: SEVERAL DAYS
5. BEING SO RESTLESS THAT IT IS HARD TO SIT STILL: NOT AT ALL
IF YOU CHECKED OFF ANY PROBLEMS ON THIS QUESTIONNAIRE, HOW DIFFICULT HAVE THESE PROBLEMS MADE IT FOR YOU TO DO YOUR WORK, TAKE CARE OF THINGS AT HOME, OR GET ALONG WITH OTHER PEOPLE: SOMEWHAT DIFFICULT
4. TROUBLE RELAXING: SEVERAL DAYS
IF YOU CHECKED OFF ANY PROBLEMS ON THIS QUESTIONNAIRE, HOW DIFFICULT HAVE THESE PROBLEMS MADE IT FOR YOU TO DO YOUR WORK, TAKE CARE OF THINGS AT HOME, OR GET ALONG WITH OTHER PEOPLE: SOMEWHAT DIFFICULT
4. TROUBLE RELAXING: SEVERAL DAYS
7. FEELING AFRAID AS IF SOMETHING AWFUL MIGHT HAPPEN: SEVERAL DAYS
3. WORRYING TOO MUCH ABOUT DIFFERENT THINGS: SEVERAL DAYS
GAD7 TOTAL SCORE: 6
7. FEELING AFRAID AS IF SOMETHING AWFUL MIGHT HAPPEN: SEVERAL DAYS
5. BEING SO RESTLESS THAT IT IS HARD TO SIT STILL: NOT AT ALL
2. NOT BEING ABLE TO STOP OR CONTROL WORRYING: SEVERAL DAYS
6. BECOMING EASILY ANNOYED OR IRRITABLE: SEVERAL DAYS
3. WORRYING TOO MUCH ABOUT DIFFERENT THINGS: SEVERAL DAYS

## 2025-08-25 ENCOUNTER — TELEMEDICINE (OUTPATIENT)
Age: 50
End: 2025-08-25
Payer: COMMERCIAL

## 2025-08-25 DIAGNOSIS — F43.23 ADJUSTMENT DISORDER WITH MIXED ANXIETY AND DEPRESSED MOOD: Primary | ICD-10-CM

## 2025-08-25 PROCEDURE — 90832 PSYTX W PT 30 MINUTES: CPT | Performed by: PSYCHOLOGIST

## (undated) DEVICE — PENROSE DRAIN 12" X 1/4: Brand: CARDINAL HEALTH

## (undated) DEVICE — STAPLER EXT SKIN 35 WIDE S STL STPL SQUEEZE HNDL VISISTAT

## (undated) DEVICE — SUTURE PERMAHAND SZ 2-0 L12X18IN NONABSORBABLE BLK SILK A185H

## (undated) DEVICE — BLADE CLIPPER GEN PURP NS

## (undated) DEVICE — PACK PROCEDURE SURG EXTREMITY MFFOP CUST

## (undated) DEVICE — SYRINGE, LUER LOCK, 10ML: Brand: MEDLINE

## (undated) DEVICE — ELECTRODE 8227410 PAIRED 2 CH SET ROHS

## (undated) DEVICE — 3M™ STERI-STRIP™ REINFORCED ADHESIVE SKIN CLOSURES, R1546, 1/4 IN X 4 IN (6 MM X 100 MM), 10 STRIPS/ENVELOPE: Brand: 3M™ STERI-STRIP™

## (undated) DEVICE — DEVICE SECUREMENT AD W/ TRICOT ANCHR PD FOR F LTX SIL CATH

## (undated) DEVICE — DRAPE,LAPAROTOMY,PED,STERILE: Brand: MEDLINE

## (undated) DEVICE — CORD,CAUTERY,BIPOLAR,STERILE: Brand: MEDLINE

## (undated) DEVICE — HYPODERMIC SAFETY NEEDLE: Brand: MAGELLAN

## (undated) DEVICE — GAUZE,SPONGE,4"X4",8PLY,STRL,LF,10/TRAY: Brand: MEDLINE

## (undated) DEVICE — SUTURE MCRYL + SZ 4-0 L27IN ABSRB UD L19MM PS-2 3/8 CIR MCP426H

## (undated) DEVICE — PROBE 8225825 3PK INCREMT STD PRASS ROHS

## (undated) DEVICE — SWAB CULT SGL AMIES W/O CHAR FOR THRT VAG SKIN HRT CULTSWAB

## (undated) DEVICE — SYRINGE,CONTROL,LL,FINGER,GRIP: Brand: MEDLINE INDUSTRIES, INC.

## (undated) DEVICE — ELECTRODE PT RET AD L9FT HI MOIST COND ADH HYDRGEL CORDED

## (undated) DEVICE — SUTURE PERMA-HAND SZ 2-0 L30IN NONABSORBABLE BLK L26MM SH K833H

## (undated) DEVICE — TOWEL,OR,DSP,ST,BLUE,STD,4/PK,20PK/CS: Brand: MEDLINE

## (undated) DEVICE — STRIP,CLOSURE,WOUND,MEDI-STRIP,1/2X4: Brand: MEDLINE

## (undated) DEVICE — SHEARS ENDOSCP L9CM CRV HARM FOCS +

## (undated) DEVICE — SPONGE SURG WHT KTNR DISECT RADPQ ST

## (undated) DEVICE — SUTURE CHROMIC GUT SZ 2-0 L27IN ABSRB BRN L26MM SH 1/2 CIR G123H

## (undated) DEVICE — SYRINGE MED 10ML TRNSLUC BRL PLUNG BLK MRK POLYPR CTRL

## (undated) DEVICE — SUTURE VCRL + SZ 4-0 L18IN ABSRB UD L19MM PS-2 3/8 CIR PRIM VCP496H

## (undated) DEVICE — NEEDLE FLTR 19GA L1.5IN WALL THK5UM BRN POLYPR HUB S STL

## (undated) DEVICE — MEDICINE CUP, GRADUATED, STER: Brand: MEDLINE

## (undated) DEVICE — STIMULATOR 8562010 VARI-STIM 10PK ROHS: Brand: VARI-STIM®

## (undated) DEVICE — COLLECTOR SPEC RAYON LIQ STUART DBL FOR THRT VAG SKIN WND

## (undated) DEVICE — VESSEL LOOPS,MAXI, BLUE: Brand: DEVON

## (undated) DEVICE — DRAPE TWL SURG 16X26IN BLU ORB04] ALLCARE INC]

## (undated) DEVICE — BLADE ES ELASTOMERIC COAT INSUL DURABLE BEND UPTO 90DEG

## (undated) DEVICE — TRAY PREP DRY W/ PREM GLV 2 APPL 6 SPNG 2 UNDPD 1 OVERWRAP

## (undated) DEVICE — STAPLER SKIN H3.9MM WIRE DIA0.58MM CRWN 6.9MM 35 STPL ROT

## (undated) DEVICE — INTENDED FOR TISSUE SEPARATION, AND OTHER PROCEDURES THAT REQUIRE A SHARP SURGICAL BLADE TO PUNCTURE OR CUT.: Brand: BARD-PARKER ® STAINLESS STEEL BLADES

## (undated) DEVICE — GAUZE,SPONGE,4"X4",16PLY,XRAY,STRL,LF: Brand: MEDLINE

## (undated) DEVICE — 3M™ STERI-DRAPE™ INSTRUMENT POUCH 1018: Brand: STERI-DRAPE™

## (undated) DEVICE — SOLUTION IRRIG 500ML 0.9% SOD CHL USP POUR PLAS BTL

## (undated) DEVICE — GLOVE ORANGE PI 8   MSG9080

## (undated) DEVICE — DRAPE ADOLESCENT  LAPAROTOMY